# Patient Record
Sex: FEMALE | Race: WHITE | NOT HISPANIC OR LATINO
[De-identification: names, ages, dates, MRNs, and addresses within clinical notes are randomized per-mention and may not be internally consistent; named-entity substitution may affect disease eponyms.]

---

## 2018-12-20 PROBLEM — Z00.00 ENCOUNTER FOR PREVENTIVE HEALTH EXAMINATION: Status: ACTIVE | Noted: 2018-12-20

## 2018-12-21 ENCOUNTER — RECORD ABSTRACTING (OUTPATIENT)
Age: 83
End: 2018-12-21

## 2018-12-21 DIAGNOSIS — I25.10 ATHEROSCLEROTIC HEART DISEASE OF NATIVE CORONARY ARTERY W/OUT ANGINA PECTORIS: ICD-10-CM

## 2018-12-21 DIAGNOSIS — R31.29 OTHER MICROSCOPIC HEMATURIA: ICD-10-CM

## 2018-12-21 DIAGNOSIS — R07.9 CHEST PAIN, UNSPECIFIED: ICD-10-CM

## 2018-12-21 DIAGNOSIS — E78.5 HYPERLIPIDEMIA, UNSPECIFIED: ICD-10-CM

## 2018-12-21 DIAGNOSIS — Z84.1 FAMILY HISTORY OF DISORDERS OF KIDNEY AND URETER: ICD-10-CM

## 2018-12-21 DIAGNOSIS — H81.10 BENIGN PAROXYSMAL VERTIGO, UNSPECIFIED EAR: ICD-10-CM

## 2018-12-21 DIAGNOSIS — R63.4 ABNORMAL WEIGHT LOSS: ICD-10-CM

## 2018-12-21 DIAGNOSIS — Z82.5 FAMILY HISTORY OF ASTHMA AND OTHER CHRONIC LOWER RESPIRATORY DISEASES: ICD-10-CM

## 2019-03-27 ENCOUNTER — MEDICATION RENEWAL (OUTPATIENT)
Age: 84
End: 2019-03-27

## 2019-12-27 ENCOUNTER — APPOINTMENT (OUTPATIENT)
Dept: INTERNAL MEDICINE | Facility: CLINIC | Age: 84
End: 2019-12-27

## 2019-12-27 ENCOUNTER — RX RENEWAL (OUTPATIENT)
Age: 84
End: 2019-12-27

## 2020-03-11 RX ORDER — LOSARTAN POTASSIUM AND HYDROCHLOROTHIAZIDE 12.5; 1 MG/1; MG/1
100-12.5 TABLET ORAL DAILY
Qty: 90 | Refills: 2 | Status: COMPLETED | COMMUNITY
End: 2020-03-11

## 2022-04-27 ENCOUNTER — NON-APPOINTMENT (OUTPATIENT)
Age: 87
End: 2022-04-27

## 2022-04-27 ENCOUNTER — APPOINTMENT (OUTPATIENT)
Dept: INTERNAL MEDICINE | Facility: CLINIC | Age: 87
End: 2022-04-27
Payer: MEDICARE

## 2022-04-27 VITALS
HEIGHT: 62 IN | HEART RATE: 112 BPM | TEMPERATURE: 98 F | SYSTOLIC BLOOD PRESSURE: 168 MMHG | OXYGEN SATURATION: 98 % | WEIGHT: 94 LBS | DIASTOLIC BLOOD PRESSURE: 109 MMHG | BODY MASS INDEX: 17.3 KG/M2

## 2022-04-27 VITALS — DIASTOLIC BLOOD PRESSURE: 98 MMHG | SYSTOLIC BLOOD PRESSURE: 144 MMHG

## 2022-04-27 VITALS — SYSTOLIC BLOOD PRESSURE: 144 MMHG | DIASTOLIC BLOOD PRESSURE: 98 MMHG

## 2022-04-27 DIAGNOSIS — I50.9 HEART FAILURE, UNSPECIFIED: ICD-10-CM

## 2022-04-27 DIAGNOSIS — M1A.9XX0 CHRONIC GOUT, UNSPECIFIED, W/OUT TOPHUS (TOPHI): ICD-10-CM

## 2022-04-27 DIAGNOSIS — I42.9 CARDIOMYOPATHY, UNSPECIFIED: ICD-10-CM

## 2022-04-27 DIAGNOSIS — F41.1 GENERALIZED ANXIETY DISORDER: ICD-10-CM

## 2022-04-27 DIAGNOSIS — E11.9 TYPE 2 DIABETES MELLITUS W/OUT COMPLICATIONS: ICD-10-CM

## 2022-04-27 DIAGNOSIS — I10 ESSENTIAL (PRIMARY) HYPERTENSION: ICD-10-CM

## 2022-04-27 DIAGNOSIS — M05.20 RHEUMATOID VASCULITIS WITH RHEUMATOID ARTHRITIS OF UNSPECIFIED SITE: ICD-10-CM

## 2022-04-27 DIAGNOSIS — F41.9 ANXIETY DISORDER, UNSPECIFIED: ICD-10-CM

## 2022-04-27 DIAGNOSIS — R42 DIZZINESS AND GIDDINESS: ICD-10-CM

## 2022-04-27 PROCEDURE — 36415 COLL VENOUS BLD VENIPUNCTURE: CPT

## 2022-04-27 PROCEDURE — 93000 ELECTROCARDIOGRAM COMPLETE: CPT

## 2022-04-27 PROCEDURE — 99205 OFFICE O/P NEW HI 60 MIN: CPT | Mod: 25

## 2022-04-27 RX ORDER — HYDROCHLOROTHIAZIDE 12.5 MG/1
12.5 TABLET ORAL DAILY
Qty: 90 | Refills: 3 | Status: COMPLETED | COMMUNITY
Start: 2019-12-27 | End: 2022-04-27

## 2022-04-27 RX ORDER — LOSARTAN POTASSIUM 100 MG/1
100 TABLET, FILM COATED ORAL DAILY
Qty: 90 | Refills: 3 | Status: ACTIVE | COMMUNITY
Start: 2020-03-11

## 2022-04-27 RX ORDER — MECLIZINE HYDROCHLORIDE 12.5 MG/1
12.5 TABLET ORAL TWICE DAILY
Qty: 180 | Refills: 3 | Status: ACTIVE | COMMUNITY
Start: 2022-04-27 | End: 1900-01-01

## 2022-04-27 NOTE — PHYSICAL EXAM
[No Acute Distress] : no acute distress [Normal Voice/Communication] : normal voice/communication [Normal Sclera/Conjunctiva] : normal sclera/conjunctiva [Normal Outer Ear/Nose] : the outer ears and nose were normal in appearance [No JVD] : no jugular venous distention [No Lymphadenopathy] : no lymphadenopathy [Supple] : supple [No Respiratory Distress] : no respiratory distress  [No Accessory Muscle Use] : no accessory muscle use [Clear to Auscultation] : lungs were clear to auscultation bilaterally [Normal Rate] : normal rate  [Premature Beats] : regular with premature beats [Normal S1] : normal S1 [Normal S2] : normal S2 [I] : a grade 1 [No Carotid Bruits] : no carotid bruits [No Abdominal Bruit] : a ~M bruit was not heard ~T in the abdomen [No Varicosities] : no varicosities [Pedal Pulses Present] : the pedal pulses are present [No Palpable Aorta] : no palpable aorta [Normal Appearance] : normal in appearance [No Masses] : no palpable masses [Soft] : abdomen soft [Non Tender] : non-tender [Non-distended] : non-distended [No HSM] : no HSM [Normal Axillary Nodes] : no axillary lymphadenopathy [Normal Posterior Cervical Nodes] : no posterior cervical lymphadenopathy [Normal Anterior Cervical Nodes] : no anterior cervical lymphadenopathy [No CVA Tenderness] : no CVA  tenderness [Kyphosis] : kyphosis [Scoliosis] : scoliosis [No Joint Swelling] : no joint swelling [No Rash] : no rash [Coordination Grossly Intact] : coordination grossly intact [Normal Affect] : the affect was normal [Normal Insight/Judgement] : insight and judgment were intact

## 2022-04-27 NOTE — PLAN
[FreeTextEntry1] : Check bloods\par Visiting nurse service forms filled out\par Further workup pending bloods\par Likely would benefit from having part-time aide at home as well as physical therapy

## 2022-04-27 NOTE — ASSESSMENT
[FreeTextEntry1] : Multiple ongoing issues with worst condition due to her end-stage joint deformities from gout and rheumatoid arthritis with severe effect on her hands back and gait\par She has been homebound for over 2 years\par Visiting nurse referral was made to assess for home health aide and physical therapy at home\par She has had multiple falls but on exam today has no concerning injury\par Her EKG shows a left bundle branch block and first degree AV block and is unchanged from prior EKGs\par She has known cardiomyopathy and a propensity to CHF\par She declines aggressive workup due to her age

## 2022-04-27 NOTE — HISTORY OF PRESENT ILLNESS
[FreeTextEntry1] : here after 4 years  [de-identified] : end stage gout\par CHF/cardiomyopathy\par hyperlipidemia on statin\par anxiety and paranoia\par end stage gout\par has not seen a doctor in a couple of years\par Has had a doctor who makes house calls a Dr. Trae Mendoza ordering her meds and apparently visiting her infrequently\par Old chart reviewed\par Patient's current medications reviewed and she is no longer taking her simvastatin\par Patient was seen with son Guerrero and all questions were answered\par

## 2022-04-28 LAB
ALBUMIN SERPL ELPH-MCNC: 4.2 G/DL
ALP BLD-CCNC: 50 U/L
ALT SERPL-CCNC: 10 U/L
ANION GAP SERPL CALC-SCNC: 13 MMOL/L
AST SERPL-CCNC: 19 U/L
BASOPHILS # BLD AUTO: 0.05 K/UL
BASOPHILS NFR BLD AUTO: 1.1 %
BILIRUB SERPL-MCNC: 0.5 MG/DL
BUN SERPL-MCNC: 14 MG/DL
CALCIUM SERPL-MCNC: 9.5 MG/DL
CHLORIDE SERPL-SCNC: 98 MMOL/L
CHOLEST SERPL-MCNC: 187 MG/DL
CK SERPL-CCNC: 93 U/L
CO2 SERPL-SCNC: 23 MMOL/L
CREAT SERPL-MCNC: 0.56 MG/DL
EGFR: 86 ML/MIN/1.73M2
EOSINOPHIL # BLD AUTO: 0.08 K/UL
EOSINOPHIL NFR BLD AUTO: 1.8 %
ESTIMATED AVERAGE GLUCOSE: 111 MG/DL
GLUCOSE SERPL-MCNC: 115 MG/DL
HBA1C MFR BLD HPLC: 5.5 %
HCT VFR BLD CALC: 41 %
HDLC SERPL-MCNC: 57 MG/DL
HGB BLD-MCNC: 13.3 G/DL
IMM GRANULOCYTES NFR BLD AUTO: 0.2 %
LDLC SERPL CALC-MCNC: 118 MG/DL
LYMPHOCYTES # BLD AUTO: 1.17 K/UL
LYMPHOCYTES NFR BLD AUTO: 26.3 %
MAN DIFF?: NORMAL
MCHC RBC-ENTMCNC: 29 PG
MCHC RBC-ENTMCNC: 32.4 GM/DL
MCV RBC AUTO: 89.3 FL
MONOCYTES # BLD AUTO: 0.48 K/UL
MONOCYTES NFR BLD AUTO: 10.8 %
NEUTROPHILS # BLD AUTO: 2.66 K/UL
NEUTROPHILS NFR BLD AUTO: 59.8 %
NONHDLC SERPL-MCNC: 130 MG/DL
PLATELET # BLD AUTO: 233 K/UL
POTASSIUM SERPL-SCNC: 4 MMOL/L
PROT SERPL-MCNC: 7.6 G/DL
RBC # BLD: 4.59 M/UL
RBC # FLD: 13.9 %
SODIUM SERPL-SCNC: 134 MMOL/L
T4 FREE SERPL-MCNC: 1.8 NG/DL
TRIGL SERPL-MCNC: 62 MG/DL
TSH SERPL-ACNC: 2.86 UIU/ML
URATE SERPL-MCNC: 2.3 MG/DL
VIT B12 SERPL-MCNC: 344 PG/ML
WBC # FLD AUTO: 4.45 K/UL

## 2022-10-03 RX ORDER — ALPRAZOLAM 0.5 MG/1
0.5 TABLET ORAL 4 TIMES DAILY
Refills: 0 | Status: COMPLETED | COMMUNITY
End: 2022-10-03

## 2023-05-11 ENCOUNTER — NON-APPOINTMENT (OUTPATIENT)
Age: 88
End: 2023-05-11

## 2023-05-21 ENCOUNTER — INPATIENT (INPATIENT)
Facility: HOSPITAL | Age: 88
LOS: 2 days | Discharge: HOME HEALTH | End: 2023-05-24
Attending: INTERNAL MEDICINE | Admitting: INTERNAL MEDICINE
Payer: MEDICARE

## 2023-05-21 VITALS
SYSTOLIC BLOOD PRESSURE: 158 MMHG | HEART RATE: 110 BPM | TEMPERATURE: 98 F | DIASTOLIC BLOOD PRESSURE: 111 MMHG | OXYGEN SATURATION: 100 % | HEIGHT: 60 IN | RESPIRATION RATE: 17 BRPM | WEIGHT: 89.95 LBS

## 2023-05-21 DIAGNOSIS — I10 ESSENTIAL (PRIMARY) HYPERTENSION: ICD-10-CM

## 2023-05-21 DIAGNOSIS — F41.9 ANXIETY DISORDER, UNSPECIFIED: ICD-10-CM

## 2023-05-21 DIAGNOSIS — M06.9 RHEUMATOID ARTHRITIS, UNSPECIFIED: ICD-10-CM

## 2023-05-21 DIAGNOSIS — R07.9 CHEST PAIN, UNSPECIFIED: ICD-10-CM

## 2023-05-21 DIAGNOSIS — Z86.79 PERSONAL HISTORY OF OTHER DISEASES OF THE CIRCULATORY SYSTEM: ICD-10-CM

## 2023-05-21 DIAGNOSIS — Z65.9 PROBLEM RELATED TO UNSPECIFIED PSYCHOSOCIAL CIRCUMSTANCES: ICD-10-CM

## 2023-05-21 LAB
ALBUMIN SERPL ELPH-MCNC: 3.3 G/DL — SIGNIFICANT CHANGE UP (ref 3.3–5)
ALP SERPL-CCNC: 54 U/L — SIGNIFICANT CHANGE UP (ref 40–120)
ALT FLD-CCNC: 19 U/L — SIGNIFICANT CHANGE UP (ref 12–78)
ANION GAP SERPL CALC-SCNC: 5 MMOL/L — SIGNIFICANT CHANGE UP (ref 5–17)
APPEARANCE UR: CLEAR — SIGNIFICANT CHANGE UP
AST SERPL-CCNC: 18 U/L — SIGNIFICANT CHANGE UP (ref 15–37)
BACTERIA # UR AUTO: ABNORMAL
BASOPHILS # BLD AUTO: 0.04 K/UL — SIGNIFICANT CHANGE UP (ref 0–0.2)
BASOPHILS NFR BLD AUTO: 0.3 % — SIGNIFICANT CHANGE UP (ref 0–2)
BILIRUB SERPL-MCNC: 0.7 MG/DL — SIGNIFICANT CHANGE UP (ref 0.2–1.2)
BILIRUB UR-MCNC: NEGATIVE — SIGNIFICANT CHANGE UP
BUN SERPL-MCNC: 18 MG/DL — SIGNIFICANT CHANGE UP (ref 7–23)
CALCIUM SERPL-MCNC: 8.9 MG/DL — SIGNIFICANT CHANGE UP (ref 8.5–10.1)
CHLORIDE SERPL-SCNC: 99 MMOL/L — SIGNIFICANT CHANGE UP (ref 96–108)
CO2 SERPL-SCNC: 28 MMOL/L — SIGNIFICANT CHANGE UP (ref 22–31)
COLOR SPEC: YELLOW — SIGNIFICANT CHANGE UP
CREAT SERPL-MCNC: 0.52 MG/DL — SIGNIFICANT CHANGE UP (ref 0.5–1.3)
DIFF PNL FLD: NEGATIVE — SIGNIFICANT CHANGE UP
EGFR: 87 ML/MIN/1.73M2 — SIGNIFICANT CHANGE UP
EOSINOPHIL # BLD AUTO: 0.08 K/UL — SIGNIFICANT CHANGE UP (ref 0–0.5)
EOSINOPHIL NFR BLD AUTO: 0.7 % — SIGNIFICANT CHANGE UP (ref 0–6)
EPI CELLS # UR: SIGNIFICANT CHANGE UP
GLUCOSE SERPL-MCNC: 120 MG/DL — HIGH (ref 70–99)
GLUCOSE UR QL: NEGATIVE MG/DL — SIGNIFICANT CHANGE UP
HCT VFR BLD CALC: 42.7 % — SIGNIFICANT CHANGE UP (ref 34.5–45)
HGB BLD-MCNC: 14 G/DL — SIGNIFICANT CHANGE UP (ref 11.5–15.5)
IMM GRANULOCYTES NFR BLD AUTO: 0.6 % — SIGNIFICANT CHANGE UP (ref 0–0.9)
KETONES UR-MCNC: NEGATIVE — SIGNIFICANT CHANGE UP
LEUKOCYTE ESTERASE UR-ACNC: NEGATIVE — SIGNIFICANT CHANGE UP
LIDOCAIN IGE QN: 220 U/L — SIGNIFICANT CHANGE UP (ref 73–393)
LYMPHOCYTES # BLD AUTO: 0.65 K/UL — LOW (ref 1–3.3)
LYMPHOCYTES # BLD AUTO: 5.5 % — LOW (ref 13–44)
MCHC RBC-ENTMCNC: 29.9 PG — SIGNIFICANT CHANGE UP (ref 27–34)
MCHC RBC-ENTMCNC: 32.8 G/DL — SIGNIFICANT CHANGE UP (ref 32–36)
MCV RBC AUTO: 91.2 FL — SIGNIFICANT CHANGE UP (ref 80–100)
MONOCYTES # BLD AUTO: 0.91 K/UL — HIGH (ref 0–0.9)
MONOCYTES NFR BLD AUTO: 7.6 % — SIGNIFICANT CHANGE UP (ref 2–14)
NEUTROPHILS # BLD AUTO: 10.16 K/UL — HIGH (ref 1.8–7.4)
NEUTROPHILS NFR BLD AUTO: 85.3 % — HIGH (ref 43–77)
NITRITE UR-MCNC: NEGATIVE — SIGNIFICANT CHANGE UP
NRBC # BLD: 0 /100 WBCS — SIGNIFICANT CHANGE UP (ref 0–0)
NT-PROBNP SERPL-SCNC: 2584 PG/ML — HIGH (ref 0–450)
PH UR: 8 — SIGNIFICANT CHANGE UP (ref 5–8)
PLATELET # BLD AUTO: 211 K/UL — SIGNIFICANT CHANGE UP (ref 150–400)
POTASSIUM SERPL-MCNC: 4 MMOL/L — SIGNIFICANT CHANGE UP (ref 3.5–5.3)
POTASSIUM SERPL-SCNC: 4 MMOL/L — SIGNIFICANT CHANGE UP (ref 3.5–5.3)
PROT SERPL-MCNC: 7.6 GM/DL — SIGNIFICANT CHANGE UP (ref 6–8.3)
PROT UR-MCNC: NEGATIVE MG/DL — SIGNIFICANT CHANGE UP
RBC # BLD: 4.68 M/UL — SIGNIFICANT CHANGE UP (ref 3.8–5.2)
RBC # FLD: 13.4 % — SIGNIFICANT CHANGE UP (ref 10.3–14.5)
RBC CASTS # UR COMP ASSIST: NEGATIVE /HPF — SIGNIFICANT CHANGE UP (ref 0–4)
SODIUM SERPL-SCNC: 132 MMOL/L — LOW (ref 135–145)
SP GR SPEC: 1.01 — SIGNIFICANT CHANGE UP (ref 1.01–1.02)
T3 SERPL-MCNC: 69 NG/DL — LOW (ref 80–200)
T4 AB SER-ACNC: 8.7 UG/DL — SIGNIFICANT CHANGE UP (ref 4.6–12)
TROPONIN I, HIGH SENSITIVITY RESULT: 7 NG/L — SIGNIFICANT CHANGE UP
TSH SERPL-MCNC: 2.06 UIU/ML — SIGNIFICANT CHANGE UP (ref 0.36–3.74)
UROBILINOGEN FLD QL: NEGATIVE MG/DL — SIGNIFICANT CHANGE UP
WBC # BLD: 11.91 K/UL — HIGH (ref 3.8–10.5)
WBC # FLD AUTO: 11.91 K/UL — HIGH (ref 3.8–10.5)
WBC UR QL: NEGATIVE — SIGNIFICANT CHANGE UP

## 2023-05-21 PROCEDURE — 93010 ELECTROCARDIOGRAM REPORT: CPT

## 2023-05-21 PROCEDURE — 99285 EMERGENCY DEPT VISIT HI MDM: CPT

## 2023-05-21 PROCEDURE — 71045 X-RAY EXAM CHEST 1 VIEW: CPT | Mod: 26

## 2023-05-21 RX ORDER — ASPIRIN/CALCIUM CARB/MAGNESIUM 324 MG
325 TABLET ORAL DAILY
Refills: 0 | Status: DISCONTINUED | OUTPATIENT
Start: 2023-05-22 | End: 2023-05-24

## 2023-05-21 RX ORDER — HEPARIN SODIUM 5000 [USP'U]/ML
5000 INJECTION INTRAVENOUS; SUBCUTANEOUS EVERY 12 HOURS
Refills: 0 | Status: DISCONTINUED | OUTPATIENT
Start: 2023-05-21 | End: 2023-05-24

## 2023-05-21 RX ORDER — ATORVASTATIN CALCIUM 80 MG/1
20 TABLET, FILM COATED ORAL AT BEDTIME
Refills: 0 | Status: DISCONTINUED | OUTPATIENT
Start: 2023-05-21 | End: 2023-05-24

## 2023-05-21 RX ORDER — ACETAMINOPHEN 500 MG
650 TABLET ORAL EVERY 6 HOURS
Refills: 0 | Status: DISCONTINUED | OUTPATIENT
Start: 2023-05-21 | End: 2023-05-24

## 2023-05-21 RX ORDER — SERTRALINE 25 MG/1
25 TABLET, FILM COATED ORAL DAILY
Refills: 0 | Status: DISCONTINUED | OUTPATIENT
Start: 2023-05-21 | End: 2023-05-24

## 2023-05-21 RX ORDER — METOPROLOL TARTRATE 50 MG
50 TABLET ORAL DAILY
Refills: 0 | Status: DISCONTINUED | OUTPATIENT
Start: 2023-05-21 | End: 2023-05-24

## 2023-05-21 RX ORDER — CARVEDILOL PHOSPHATE 80 MG/1
12.5 CAPSULE, EXTENDED RELEASE ORAL ONCE
Refills: 0 | Status: COMPLETED | OUTPATIENT
Start: 2023-05-21 | End: 2023-05-21

## 2023-05-21 RX ORDER — ALPRAZOLAM 0.25 MG
0.25 TABLET ORAL
Refills: 0 | Status: DISCONTINUED | OUTPATIENT
Start: 2023-05-21 | End: 2023-05-24

## 2023-05-21 RX ORDER — ONDANSETRON 8 MG/1
4 TABLET, FILM COATED ORAL ONCE
Refills: 0 | Status: COMPLETED | OUTPATIENT
Start: 2023-05-21 | End: 2023-05-21

## 2023-05-21 RX ORDER — LOSARTAN POTASSIUM 100 MG/1
100 TABLET, FILM COATED ORAL DAILY
Refills: 0 | Status: DISCONTINUED | OUTPATIENT
Start: 2023-05-21 | End: 2023-05-24

## 2023-05-21 RX ORDER — ALPRAZOLAM 0.25 MG
0.5 TABLET ORAL ONCE
Refills: 0 | Status: DISCONTINUED | OUTPATIENT
Start: 2023-05-21 | End: 2023-05-21

## 2023-05-21 RX ORDER — ASPIRIN/CALCIUM CARB/MAGNESIUM 324 MG
324 TABLET ORAL ONCE
Refills: 0 | Status: COMPLETED | OUTPATIENT
Start: 2023-05-21 | End: 2023-05-21

## 2023-05-21 RX ORDER — NITROGLYCERIN 6.5 MG
0.4 CAPSULE, EXTENDED RELEASE ORAL
Refills: 0 | Status: DISCONTINUED | OUTPATIENT
Start: 2023-05-21 | End: 2023-05-24

## 2023-05-21 RX ADMIN — ATORVASTATIN CALCIUM 20 MILLIGRAM(S): 80 TABLET, FILM COATED ORAL at 22:11

## 2023-05-21 RX ADMIN — Medication 0.5 MILLIGRAM(S): at 10:09

## 2023-05-21 RX ADMIN — CARVEDILOL PHOSPHATE 12.5 MILLIGRAM(S): 80 CAPSULE, EXTENDED RELEASE ORAL at 10:09

## 2023-05-21 RX ADMIN — Medication 650 MILLIGRAM(S): at 18:30

## 2023-05-21 RX ADMIN — HEPARIN SODIUM 5000 UNIT(S): 5000 INJECTION INTRAVENOUS; SUBCUTANEOUS at 17:08

## 2023-05-21 RX ADMIN — Medication 0.25 MILLIGRAM(S): at 22:11

## 2023-05-21 RX ADMIN — Medication 650 MILLIGRAM(S): at 17:08

## 2023-05-21 NOTE — ED PROVIDER NOTE - PROGRESS NOTE DETAILS
Neville: Missed home meds, feels anxious that she got brought to wrong hospital so requesting home prn xanax.  Labs non actionable, patient feels well and tolerating po.  Will dc. Neville: Patient initially wanted to go home and felt well to do so because she doesn't want to stay but started becoming anxious at the idea since she has difficulty getting around with walker, has no support at home, doesn't have water in the house and nobody to pick it up.  OSH working on getting aide at home but not set up yet.  Unsafe discharge, will need admission for social reasons.

## 2023-05-21 NOTE — PATIENT PROFILE ADULT - FALL HARM RISK - HARM RISK INTERVENTIONS
Communicate Risk of Fall with Harm to all staff/Reinforce activity limits and safety measures with patient and family/Tailored Fall Risk Interventions/Visual Cue: Yellow wristband and red socks/Bed in lowest position, wheels locked, appropriate side rails in place/Call bell, personal items and telephone in reach/Instruct patient to call for assistance before getting out of bed or chair/Non-slip footwear when patient is out of bed/[place] to call system/Physically safe environment - no spills, clutter or unnecessary equipment/Purposeful Proactive Rounding/Room/bathroom lighting operational, light cord in reach Assistance with ambulation/Assistance OOB with selected safe patient handling equipment/Communicate Risk of Fall with Harm to all staff/Discuss with provider need for PT consult/Monitor gait and stability/Provide patient with walking aids - walker, cane, crutches/Reinforce activity limits and safety measures with patient and family/Sit up slowly, dangle for a short time, stand at bedside before walking/Tailored Fall Risk Interventions/Visual Cue: Yellow wristband and red socks/Bed in lowest position, wheels locked, appropriate side rails in place/Call bell, personal items and telephone in reach/Instruct patient to call for assistance before getting out of bed or chair/Non-slip footwear when patient is out of bed/[place] to call system/Physically safe environment - no spills, clutter or unnecessary equipment/Purposeful Proactive Rounding/Room/bathroom lighting operational, light cord in reach

## 2023-05-21 NOTE — ED PROVIDER NOTE - PATIENT PORTAL LINK FT
You can access the FollowMyHealth Patient Portal offered by [facility] by registering at the following website: [email or web address]. By joining [employer]’s FollowMyHealth portal, you will also be able to view your health information using other applications (apps) compatible with our system.

## 2023-05-21 NOTE — ED PROVIDER NOTE - PHYSICAL EXAMINATION
General appearance: Nontoxic appearing, anxious, conversant, afebrile    Eyes: anicteric sclerae, CARA, EOMI   HENT: Atraumatic; oropharynx clear, MMM and no ulcerations, no pharyngeal erythema or exudate   Neck: Trachea midline; Full range of motion, supple   Pulm: CTA bl, normal respiratory effort and no intercostal retractions, normal work of breathing   CV: Tachycardic, regular, No murmurs, rubs, or gallops   Abdomen: Soft, non-tender, non-distended; no guarding or rebound   Extremities: No peripheral edema, FROM x4, multiple rheumatoid nodules b/l UE   Skin: Dry, normal temperature, turgor and texture; no rash   Psych: Appropriate affect, cooperative

## 2023-05-21 NOTE — ED ADULT NURSE NOTE - PRO INTERPRETER NEED 2
Chief Complaint   Patient presents with    Asthma    COPD    Panic Attack    Sad         HPI:       is a 62 y.o. female who until July 2016 worked for [employer][1] [employer] in [facility]. Developed LBP with RLE sciatica. PT and NATALIIA. Has not used a TENS unit. Well until presenting suddenly with hypoxia and SOB to [facility] ER early March 2017 with acute PE diagnosed by Dr Doug Laguna. Place on Lovenox as bridge to Warfarin; Suffered acute hemorrhage to the RLE requiring rehospitalization at ED [facility]; Discharged home 3/28 on Apixaban 5 mg BID. New Issues:  Remains unable to work. Here today completing paperwork for patient assistance for Apixaban    Allergies   Allergen Reactions    Pcn [Penicillins] Rash    Gabapentin Other (comments)     Hallucinations/ sleep walking    Griseofulvin Other (comments)     Aaron-dirk syndrome    Pcn [Penicillins] Rash and Swelling    Tramadol Nausea Only and Drowsiness     Interaction with Effexor       Current Outpatient Prescriptions   Medication Sig    ALPRAZolam (XANAX) 0.5 mg tablet Take 1 Tab by mouth nightly as needed for Anxiety. Max Daily Amount: 0.5 mg.    apixaban (ELIQUIS) 5 mg tablet Take 1 Tab by mouth two (2) times a day.  diphenhydrAMINE (BENADRYL) 25 mg capsule Take 25 mg by mouth every six (6) hours as needed.  calcium carbonate (TUMS) 200 mg calcium (500 mg) chew Take 3 Tabs by mouth daily.  venlafaxine-SR (EFFEXOR XR) 150 mg capsule Take 150 mg by mouth daily.  albuterol (PROVENTIL VENTOLIN) 2.5 mg /3 mL (0.083 %) nebulizer solution by Nebulization route once.  vitamin A (AQUASOL A) 10,000 unit capsule Take 10,000 Units by mouth daily.  cyanocobalamin (VITAMIN B-12) 1,000 mcg tablet Take 1,000 mcg by mouth daily.  ferrous sulfate (SLOW FE) 142 mg (45 mg iron) ER tablet Take 45 mg by mouth Daily (before breakfast).  ergocalciferol (ERGOCALCIFEROL) 50,000 unit capsule Take 1 Cap by mouth every seven (7) days.     esomeprazole (NEXIUM) 40 mg capsule Take 1 Cap by mouth daily.  mometasone-formoterol (DULERA) 200-5 mcg/actuation HFA inhaler Take 2 Puffs by inhalation two (2) times a day.  albuterol (PROAIR HFA) 90 mcg/actuation inhaler Take 2 Puffs by inhalation every four (4) hours as needed for Wheezing.  nortriptyline (PAMELOR) 25 mg capsule take 1 to 2 tablets by mouth at bedtime if needed    zolpidem (AMBIEN) 5 mg tablet Take 1 Tab by mouth nightly as needed for Sleep. Max Daily Amount: 5 mg.  sucralfate (CARAFATE) 100 mg/mL suspension Take 5 mL by mouth four (4) times daily.  cyclobenzaprine (FLEXERIL) 10 mg tablet Take 1 Tab by mouth three (3) times daily as needed for Muscle Spasm(s).  albuterol-ipratropium (DUO-NEB) 2.5 mg-0.5 mg/3 ml nebulizer solution 3 mL by Nebulization route every four (4) hours. Indications: CHRONIC OBSTRUCTIVE PULMONARY DISEASE WITH BRONCHOSPASMS    inhalational spacing device 1 Each by Does Not Apply route as needed. No current facility-administered medications for this visit. Past Medical History:   Diagnosis Date    Anemia 3/15/2013    Arrhythmia     paroxsimal afib    Asthma 3/15/2013    Asthma     Back disorder     disc problem    Chronic obstructive pulmonary disease (HCC)     Morbid obesity ([address] 3/15/2013         ROS:  Denies fever, chills, cough, chest pain, SOB,  nausea, vomiting, or diarrhea. Denies wt loss, wt gain, hemoptysis, hematochezia or melena.     Physical Examination:    /52 Comment: large cuff  Pulse 87  Temp 97.1 °F (36.2 °C) (Oral)   Resp 16  Ht 5' 6\" (1.676 m)  Wt 251 lb (113.9 kg)  SpO2 94%  BMI 40.51 kg/m2    General: Alert and Ox3, Fluent speech  HEENT:  NC/AT, EOMI, OP: clear  Neck:  Supple, no adenopathy, JVD, mass or bruit  Chest:  Clear to Ausculation, without wheezes, rales, rubs or ronchi  Cardiac: RRR  Abdomen:  +BS, soft, nontender without palpable HSM  Extremities:  No cyanosis, clubbing or edema  Neurologic:  Ambulatory without assist, CN 2-12 grossly intact. Moves all extremities. Skin: no rash  Lymphadenopathy: no cervical or supraclavicular nodes      ASSESSMENT AND PLAN:     1. PE:  Letter, paperwork and Rx completed and faxed for patient. 2.  RTC in a month    Orders Placed This Encounter    ALPRAZolam (XANAX) 0.5 mg tablet     Sig: Take 1 Tab by mouth nightly as needed for Anxiety. Max Daily Amount: 0.5 mg.     Dispense:  30 Tab     Refill:  2    apixaban (ELIQUIS) 5 mg tablet     Sig: Take 1 Tab by mouth two (2) times a day.      Dispense:  180 Tab     Refill:  4       Nurys Conti MD, Joaquin English

## 2023-05-21 NOTE — ED PROVIDER NOTE - NSFOLLOWUPINSTRUCTIONS_ED_ALL_ED_FT
Follow up with your cardiologist  Rest, drink plenty of fluids  Advance activity as tolerated  Continue all previously prescribed medications as directed  Follow up with your PMD - bring copies of your results  Return to the ER for chest pain, difficulty breathing, worsening symptoms, or other new or concerning symptoms

## 2023-05-21 NOTE — H&P ADULT - HISTORY OF PRESENT ILLNESS
: 89yo female with pmh anxiety, htn, chf, RA, presenting with epigastric discomfort, vomiting, loose stools.  Has had this frequently before and typically goes to [facility] for this but ems brought her here.  Denies pain to me, feels burning discomfort.  No sob.  Vomiting pta, minimal nausea now.  No fevers.  No pshx.  Missed 7am carvedilol.  Now  [facility]  has  CP  which  she  has  had multiple  times  in  past  states has congestive heart failure and  was told  needed  stents  but  refuses  because feels  they  rust  and  knows  several people  in her  apartment  complex  who  have    aftr  getting  hear  stents has  not  seen  her  cardilogist Dr Levi  for  several years  because  she  has no  way of  getting  to him states she did not take her medicatins this am  is anxious  about  being  admitted  and  anxious  about  going  home  because  [facility]  needs  help has no  water    [facility]  has  a son in [place]  but  does not  have his  phone  number  with her

## 2023-05-21 NOTE — ED ADULT TRIAGE NOTE - CHIEF COMPLAINT QUOTE
epigastric chest pain , nausea and vomiting. epigastric chest pain , nausea and vomiting stared one hour ago. epigastric chest pain , nausea and vomiting started one hour ago.

## 2023-05-21 NOTE — ED ADULT NURSE REASSESSMENT NOTE - NS ED NURSE REASSESS COMMENT FT1
Pt is refusing her maalox, zofran and aspirin, despite medication education. Pt states she know it will not help her stomach. Pt is resting in bed, and she called her son Edward on the phone.

## 2023-05-21 NOTE — ED PROVIDER NOTE - OBJECTIVE STATEMENT
89yo female with pmh anxiety, htn, chf, RA, presenting with epigastric discomfort, vomiting, loose stools.  Has had this frequently before and typically goes to [facility] for this but ems brought her here.  Denies pain to me, feels burning discomfort.  No sob.  Vomiting pta, minimal nausea now.  No fevers.  No pshx.  Missed 7am carvedilol.

## 2023-05-21 NOTE — H&P ADULT - PROBLEM SELECTOR PLAN 1
metoprolol  asa  add  lipitor  nitrostat prn  unclear  if  LBB  old or  new   monitor  on telemetry  trend  troponins  cardiology  evaluation

## 2023-05-21 NOTE — ED PROVIDER NOTE - CARE PLAN
1 Principal Discharge DX:	Chest pain   Principal Discharge DX:	Chest pain  Secondary Diagnosis:	Adult failure to thrive

## 2023-05-21 NOTE — ED ADULT NURSE NOTE - NSFALLHARMRISKINTERV_ED_ALL_ED

## 2023-05-21 NOTE — H&P ADULT - NSHPPHYSICALEXAM_GEN_ALL_CORE
PHYSICAL EXAM:    GENERAL: NAD, well-groomed, well-developed  HEAD:  Atraumatic, Normocephalic  EYES: EOMI, PERRLA, conjunctiva and sclera clear    NECK: Supple, No JVD, Normal thyroid  NERVOUS SYSTEM:  Alert & Oriented X 3 no focal deficits  CHEST/LUNG: Clear  bilaterally; No rales, rhonchi, wheezing, or rubs  HEART: Regular rate and rhythm; No murmurs, rubs, or gallops  ABDOMEN: Soft, Nontender, Nondistended; no  masses Bowel sounds present  EXTREMITIES  bilateral severe MCP  deformity with  flexure  contractures no  edema    LYMPH: No lymphadenopathy noted   RECTAL: deferred    SKIN: No rashes or lesions PHYSICAL EXAM:    GENERAL: NAD, well-groomed, well-developed  HEAD:  Atraumatic, Normocephalic  EYES: EOMI, PERRLA, conjunctiva and sclera clear    NECK: Supple, No JVD, Normal thyroid  NERVOUS SYSTEM:  Alert & Oriented X 3 no focal deficits  CHEST/LUNG: Clear  bilaterally; No rales, rhonchi, wheezing, or rubs  HEART: Regular rate and rhythm; No murmurs, rubs, or gallops  ABDOMEN: Soft, Nontender, Nondistended; no  masses Bowel sounds present  EXTR  bilateral  tophaceous  growths  L  >rt  no  tenderness from  EXTREMITIES  bilateral severe MCP  deformity with  flexure  contractures no  edema    LYMPH: No lymphadenopathy noted   RECTAL: deferred    SKIN: No rashes or lesions PHYSICAL EXAM:    GENERAL: NAD, well-groomed, well-developed  HEAD:  Atraumatic, Normocephalic  EYES: EOMI, PERRLA, conjunctiva and sclera clear    NECK: Supple, No JVD, Normal thyroid  NERVOUS SYSTEM:  Alert & Oriented X 3 no focal deficits  CHEST/LUNG: Clear  bilaterally; No rales, rhonchi, wheezing, or rubs  HEART: Regular rate and rhythm; No murmurs, rubs, or gallops  ABDOMEN: Soft, Nontender, Nondistended; no  masses Bowel sounds present  EXTR  bilateral  tophaceous  growths  L  >rt  no  tenderness frorm  bilateral severe MCP  deformity with  flexure  contractures no  edema    LYMPH: No lymphadenopathy noted   RECTAL: deferred    SKIN: No rashes or lesions

## 2023-05-21 NOTE — ED ADULT NURSE NOTE - OBJECTIVE STATEMENT
Pt BIBA with c/o of "burning in the chest" and rapid heart beat and dizziness. pt verbalized that she lives at a assisted living and states that she has periods of fast heart beat. Pt is a poor historian and unable to give clear chief complaints or past medical history.

## 2023-05-21 NOTE — PATIENT PROFILE ADULT - HOME ACCESSIBILITY CONCERNS
Physical Therapy    [facility],  Physical Therapy  Cancellation/No-show Note  Patient Name:  Saad Butler  :  1960   Date:  2022  Cancelled visits to date: 0  No-shows to date: ,     For today's appointment patient:  []  Cancelled  []  Rescheduled appointment  [x]  No-show     Reason given by patient:  []  Patient ill  []  Conflicting appointment  []  No transportation    []  Conflict with work  []  No reason given  []  Other:     Comments:      Phone call information:   []  Phone call made today to patient at _ time at number provided:      []  Patient answered, conversation as follows:    []  Patient did not answer, message left as follows:  [x]  Phone call not made today due to language barrier ([place]alatha)  []  Phone call not needed - pt contacted us to cancel and provided reason for cancellation.      Electronically signed by:  Margarita Jennings, [id number]
other

## 2023-05-21 NOTE — PATIENT PROFILE ADULT - FLU SEASON?
Care Management Interventions  PCP Verified by CM: Yes  Transition of Care Consult (CM Consult): Home Health, Discharge Planning  [address] [id number]: No  Reason Outside [place]:  (Used the same agency her spouse is current with. )  Current Support Network: Lives with Spouse, Other (adult son is in the home )  Confirm Follow Up Transport: Family  Plan discussed with Pt/Family/Caregiver: Yes  Freedom of Choice Offered: Yes  Discharge Location  Discharge Placement: Home with home health     DC to home with son and spouse and [facility] and PT. Maki Huang No

## 2023-05-21 NOTE — ED PROVIDER NOTE - CLINICAL SUMMARY MEDICAL DECISION MAKING FREE TEXT BOX
Presenting with cp, palpitations.  Endorsing many similar episodes as before, normally goes to [facility] for care and very anxious about being in new hospital but states presentation is similar to prior visits there.  No pain now.  No sob, diaphoresis.  Points to epigastric region, vomiting earlier with loose stools.  Possible viral, will eval for acs, not c/w pe.  Patient missed home carvedilol this am, this with anxiousness likely why tachycardic.  Also requesting home xanax.  Exam reassuring, nonfocal.  Will get labs, tele, ecg, xr, meds, reassess.

## 2023-05-21 NOTE — H&P ADULT - ASSESSMENT
IMPROVE VTE Individual Risk Assessment    RISK                                                                Points    [  ] Previous VTE                                                  3    [  ] Thrombophilia                                               2    [  ] Lower limb paralysis                                      2        (unable to hold up >15 seconds)      [  ] Current Cancer                                              2         (within 6 months)    [x  ] Immobilization > 24 hrs                                1    [  ] ICU/CCU stay > 24 hours                              1    [ x ] Age > 60                                                      1    IMPROVE VTE Score _____2____    IMPROVE Score 0-1: Low Risk, No VTE prophylaxis required for most patients, encourage ambulation.   IMPROVE Score 2-3: At risk, pharmacologic VTE prophylaxis is indicated for most patients (in the absence of a contraindication)  IMPROVE Score > or = 4: High Risk, pharmacologic VTE prophylaxis is indicated for most patients (in the absence of a contraindication)

## 2023-05-21 NOTE — PATIENT PROFILE ADULT - FUNCTIONAL ASSESSMENT - BASIC MOBILITY 6.
2-calculated by average/Not able to assess (calculate score using [facility] averaging method)

## 2023-05-21 NOTE — H&P ADULT - NSICDXPASTMEDICALHX_GEN_ALL_CORE_FT
PAST MEDICAL HISTORY:  CAD (coronary artery disease)     History of chronic CHF     HTN (hypertension)     Rheumatoid arthritis

## 2023-05-22 LAB
CULTURE RESULTS: SIGNIFICANT CHANGE UP
SPECIMEN SOURCE: SIGNIFICANT CHANGE UP
TROPONIN I, HIGH SENSITIVITY RESULT: 22.9 NG/L — SIGNIFICANT CHANGE UP
TROPONIN I, HIGH SENSITIVITY RESULT: 29 NG/L — SIGNIFICANT CHANGE UP
URATE SERPL-MCNC: 2.3 MG/DL — LOW (ref 2.5–7)

## 2023-05-22 PROCEDURE — 99223 1ST HOSP IP/OBS HIGH 75: CPT

## 2023-05-22 RX ADMIN — Medication 0.25 MILLIGRAM(S): at 14:19

## 2023-05-22 RX ADMIN — HEPARIN SODIUM 5000 UNIT(S): 5000 INJECTION INTRAVENOUS; SUBCUTANEOUS at 05:43

## 2023-05-22 RX ADMIN — Medication 0.25 MILLIGRAM(S): at 21:35

## 2023-05-22 RX ADMIN — ATORVASTATIN CALCIUM 20 MILLIGRAM(S): 80 TABLET, FILM COATED ORAL at 21:34

## 2023-05-22 RX ADMIN — HEPARIN SODIUM 5000 UNIT(S): 5000 INJECTION INTRAVENOUS; SUBCUTANEOUS at 17:46

## 2023-05-22 NOTE — CONSULT NOTE ADULT - ASSESSMENT
The chart has been reviewed but the patient has not yet been examined.  Full note to follow. 89-year-old female admitted with epigastric discomfort vomiting and change in bowel movements.  Atypical chest discomfort unlikely to be acute coronary insufficiency.  Left bundle branch block previously described and although there may be underlying coronary disease, patient steadfastly refuses any interventions.  Currently chest pain-free.  Further evaluation can be relegated to her outpatient cardiologist, which is what the patient wishes.  Patient has some barriers to her continuing to live at home but this requires social work intervention.  We will follow only as needed.

## 2023-05-22 NOTE — PROGRESS NOTE ADULT - SUBJECTIVE AND OBJECTIVE BOX
INTERVAL HPI/OVERNIGHT EVENTS:        REVIEW OF SYSTEMS:  CONSTITUTIONAL:  c/o  not  being  taken  care  of  states  will will go  home  if  this  continues    NECK: No pain or stiffnes  RESPIRATORY: No SOB   CARDIOVASCULAR: No chest pain, palpitations, dizziness,   GASTROINTESTINAL: No abdominal pain. No nausea, vomiting,   NEUROLOGICAL: No headaches, no  blurry  vision no  dizziness  SKIN: No itching,   MUSCULOSKELETAL: No pain    MEDICATION:  acetaminophen     Tablet .. 650 milliGRAM(s) Oral every 6 hours PRN  ALPRAZolam 0.25 milliGRAM(s) Oral two times a day PRN  aspirin 325 milliGRAM(s) Oral daily  atorvastatin 20 milliGRAM(s) Oral at bedtime  heparin   Injectable 5000 Unit(s) SubCutaneous every 12 hours  hydrochlorothiazide 12.5 milliGRAM(s) Oral daily  losartan 100 milliGRAM(s) Oral daily  metoprolol succinate ER 50 milliGRAM(s) Oral daily  nitroglycerin     SubLingual 0.4 milliGRAM(s) SubLingual every 5 minutes PRN  sertraline 25 milliGRAM(s) Oral daily    Vital Signs Last 24 Hrs  T(C): 36.4 (22 May 2023 05:25), Max: 36.9 (21 May 2023 10:25)  T(F): 97.6 (22 May 2023 05:25), Max: 98.4 (21 May 2023 10:25)  HR: 66 (22 May 2023 05:25) (64 - 90)  BP: 108/71 (22 May 2023 05:25) (108/71 - 154/85)  BP(mean): --  RR: 18 (22 May 2023 05:25) (18 - 18)  SpO2: 97% (22 May 2023 05:25) (95% - 98%)    Parameters below as of 21 May 2023 16:11  Patient On (Oxygen Delivery Method): room air        PHYSICAL EXAM:  GENERAL: NAD, well-groomed, well-developed  HEENT : Conjuntivae  clear sclerae anicteric  NECK: Supple, No JVD, Normal thyroid  NERVOUS SYSTEM:  Alert oriented x2  no  focal  deficits;   CHEST/LUNG: Clear    HEART: Regular rate and rhythm; No murmurs, rubs, or gallops  ABDOMEN: Soft, Nontender, Nondistended; Bowel sounds present  EXTREMITIES  bilateral  tophaceous  messes both  elbows    SKIN: No rashes   LABS:                        14.0   11.91 )-----------( 211      ( 21 May 2023 07:35 )             42.7         132<L>  |  99  |  18  ----------------------------<  120<H>  4.0   |  28  |  0.52    Ca    8.9      21 May 2023 07:35    TPro  7.6  /  Alb  3.3  /  TBili  0.7  /  DBili  x   /  AST  18  /  ALT  19  /  AlkPhos  54        Urinalysis Basic - ( 21 May 2023 09:30 )    Color: Yellow / Appearance: Clear / S.010 / pH: x  Gluc: x / Ketone: Negative  / Bili: Negative / Urobili: Negative mg/dL   Blood: x / Protein: Negative mg/dL / Nitrite: Negative   Leuk Esterase: Negative / RBC: Negative /HPF / WBC Negative   Sq Epi: x / Non Sq Epi: x / Bacteria: Occasional      CAPILLARY BLOOD GLUCOSE          RADIOLOGY & ADDITIONAL TESTS:    Imaging reports  Personally Reviewed:  [ ] YES  [ ] NO    Consultant(s) Notes Reviewed:  [ ] YES  [ ] NO    Care Discussed with Consultants/Other Providers [x ] YES  [ ] NO    Problem/Plan - 1:  ·  Problem: Chest pain.   ·  Plan: metoprolol  asa  add  lipitor  nitrostat prn  unclear  if  LBB  old or  new   monitor  on telemetry  trend  troponins  cardiology  evaluation.    Problem/Plan - 2:  ·  Problem: History of CHF (congestive heart failure).   ·  Plan: metoprolol  cozaar  hctz  check tte  cardiology  eval.    Problem/Plan - 3:  ·  Problem: HTN (hypertension).   ·  Plan: metoprolol cozaar  hctz.    Problem/Plan - 4:  ·  Problem: Rheumatoid arthritis.   ·  Plan: advanced  deformities  tylenol prn.    Problem/Plan - 5:  ·  Problem: Social problem.   ·  Plan: concern  for  unsafe  home setting  for  social service evaluation.    Problem/Plan - 6:  ·  Problem: Anxiety.   ·  Plan: low  dose  zoloft  xanax  prn.

## 2023-05-22 NOTE — CONSULT NOTE ADULT - SUBJECTIVE AND OBJECTIVE BOX
CARDIOLOGY CONSULTATION NOTE                                                                             ALIYA WILSON is a 89y Female with pmh anxiety, htn, chf, RA, presenting with epigastric discomfort, vomiting, loose stools.  Has had this frequently before and typically goes to [facility] for this but ems brought her here.  Denies pain to me, feels burning discomfort.  No sob.  Vomiting pta, minimal nausea now.  No fevers.  No pshx.  Missed 7am carvedilol. Now  [facility]  has  CP  which  she  has  had multiple  times  in  past  states has congestive heart failure and  was told  needed  stents  but  refuses  because feels  they  rust  and  knows  several people  in her  apartment  complex  who  have    aftr  getting  hear  stents has  not  seen  her  cardilogist Dr Levi  for  several years  because  she  has no  way of  getting  to him states she did not take her medicatins this am is anxious  about  being  admitted  and  anxious  about  going  home  because  [facility]  needs  help has no  water   [facility]  has  a son in [place]  but  does not  have his  phone  number  with her  (21 May 2023 13:31)   REVIEW OF SYSTEMS: -----------------------------  CONSTITUTIONAL: No fever, weight loss, or fatigue EYES: No eye pain, visual disturbances, or discharge ENMT:  No difficulty hearing, tinnitus, vertigo; No sinus or throat pain NECK: No pain or stiffness BREASTS: No pain, masses, or nipple discharge RESPIRATORY: No cough, wheezing, chills or hemoptysis; No shortness of breath CARDIOVASCULAR: See HPI GASTROINTESTINAL: No abdominal or epigastric pain. No nausea, vomiting, or hematemesis; No diarrhea or constipation. No melena or hematochezia. GENITOURINARY: No dysuria, frequency, hematuria, or incontinence NEUROLOGICAL: No headaches, memory loss, loss of strength, numbness, or tremors SKIN: No itching, burning, rashes, or lesions  LYMPH NODES: No enlarged glands ENDOCRINE: No heat or cold intolerance; No hair loss MUSCULOSKELETAL: No joint pain or swelling; No muscle, back, or extremity pain PSYCHIATRIC: No depression, anxiety, mood swings, or difficulty sleeping HEME/LYMPH: No easy bruising, or bleeding gums ALLERGY AND IMMUNOLOGIC: No hives or eczema  Home Medications: aspirin 325 mg oral tablet: 1 tab(s) orally once a day (2015 14:43) hydrochlorothiazide-losartan 12.5 mg-100 mg oral tablet: 1 tab(s) orally once a day (2015 14:43) metoprolol 50 mg oral tablet, extended release: 1 tab(s) orally once a day (2015 14:43)   MEDICATIONS  (STANDING): aspirin 325 milliGRAM(s) Oral daily atorvastatin 20 milliGRAM(s) Oral at bedtime heparin   Injectable 5000 Unit(s) SubCutaneous every 12 hours hydrochlorothiazide 12.5 milliGRAM(s) Oral daily losartan 100 milliGRAM(s) Oral daily metoprolol succinate ER 50 milliGRAM(s) Oral daily sertraline 25 milliGRAM(s) Oral daily   ALLERGIES: No Known Allergies   FAMILY HISTORY:   PHYSICAL EXAMINATION: ----------------------------- T(C): 36.6 (23 @ 10:28), Max: 36.9 (23 @ 16:11) HR: 89 (23 @ 10:28) (64 - 90) BP: 157/99 (23 @ 10:28) (108/71 - 157/99) RR: 17 (23 @ 10:28) (17 - 18) SpO2: 98% (23 @ 10:28) (95% - 98%) Wt(kg): --   @ 07:01  -   @ 12:29 -------------------------------------------------------- IN: Total IN: 0 mL  OUT:   Voided (mL): 100 mL Total OUT: 100 mL  Total NET: -100 mL      Constitutional: well developed, normal appearance, well groomed, well nourished, no deformities and no acute distress.  Eyes: the conjunctiva exhibited no abnormalities and the eyelids demonstrated no xanthelasmas.  HEENT: normal oral mucosa, no oral pallor and no oral cyanosis.  Neck: normal jugular venous A waves present, normal jugular venous V waves present and no jugular venous vazquez A waves.  Pulmonary: no respiratory distress, normal respiratory rhythm and effort, no accessory muscle use and lungs were clear to auscultation bilaterally.  Cardiovascular: heart rate and rhythm were normal, normal S1 and S2 and no murmur, gallop, rub, heave or thrill are present.  Abdomen: soft, non-tender, no hepato-splenomegaly and no abdominal mass palpated.  Musculoskeletal: the gait could not be assessed..  Extremities: no clubbing of the fingernails, no localized cyanosis, no petechial hemorrhages and no ischemic changes.  Skin: normal skin color and pigmentation, no rash, no venous stasis, no skin lesions, no skin ulcer and no xanthoma was observed.  Psychiatric: oriented to person, place, and time, the affect was normal, the mood was normal and not feeling anxious.   ECG: -------  < from: 12 Lead ECG (23 @ 06:37) >  Ventricular Rate 113 BPM  Atrial Rate 113 BPM  P-R Interval 208 ms  QRS Duration 146 ms  Q-T Interval 368 ms  QTC Calculation(Bazett) 504 ms  R Axis -53 degrees  T Axis 109 degrees  Diagnosis Line Sinus tachycardia Left axis deviation Left bundle branch block Abnormal ECG No previous ECGs available Confirmed by JOSUE CRAIN MD ([id number]) on 2023 12:02:08 PM  < end of copied text >   LABS:  --------   132<L>  |  99  |  18 ----------------------------<  120<H> 4.0   |  28  |  0.52  Ca    8.9      21 May 2023 07:35  TPro  7.6  /  Alb  3.3  /  TBili  0.7  /  DBili  x   /  AST  18  /  ALT  19  /  AlkPhos  54                         14.0  11.91 )-----------( 211      ( 21 May 2023 07:35 )            42.7      Troponin I, High Sensitivity Result: 29.0:(23 @ 06:39)  Troponin I, High Sensitivity Result: 7.0:(23 @ 07:35)   RADIOLOGY REPORTS: -----------------------------  < from: Xray Chest 1 View AP/PA. (02.23.15 @ 16:41) >  EXAM:  CHEST SINGLE VIEW                         PROCEDURE DATE:  2015    INTERPRETATION:  Chest radiograph (one view)       CLINICAL INFORMATION: Weakness  TECHNIQUE:  Single frontal view of the chest was obtained.  FINDINGS:  No previous examinations are available for review.  The lungs are clear.  No pleural abnormality is seen.  Heart size and  mediastinal contours are within normal limits. There is a large  retrocardiac mass with a air-fluid level compatible with a hiatal hernia.  IMPRESSION: No evidence of active chest disease. Large hiatal hernia.   < end of copied text >   ECHOCARDIOGRAM: ---------------------------     CARDIOLOGY CONSULTATION NOTE                                                                             ALIYA WILSON is a 89y Female with pmh anxiety, htn, chf, RA, presenting with epigastric discomfort, vomiting, loose stools.  Has had this frequently before and typically goes to [facility] for this but ems brought her here.  Denies pain to me, feels burning discomfort.  No sob.  Vomiting pta, minimal nausea now.  No fevers.  No pshx.  Missed 7am carvedilol. Now  states  has  CP  which  she  has  had multiple  times  in  past  states has congestive heart failure and  was told  needed  stents  but  refuses  because feels  they  rust  and  knows  several people  in her  apartment  complex  who  have    aftr  getting  hear  stents. She last saw her cardiologist in  and had been followed by Dr. Trae Mendoza who does house calls and manages her medications.  She has been homebound for several years due to progressively worsening arthritis and severe kyphosis she has a history of multiple falls and a known left bundle branch block with a known cardiomyopathy and propensity congestive heart failure.  She had declined aggressive work-ups on several occasions in the past.  She has also been difficult to manage from a medication standpoint having stopped multiple medications for various reasons over the years. She suffers from anxiety , on benzodiazepines at home and is anxious  about  being  admitted  and  anxious  about  going  home  because  states  needs  help     REVIEW OF SYSTEMS: -----------------------------  CONSTITUTIONAL: No fever, weight loss, or fatigue EYES: No eye pain, visual disturbances, or discharge ENMT:  No difficulty hearing, tinnitus, vertigo; No sinus or throat pain NECK: No pain or stiffness BREASTS: No pain, masses, or nipple discharge RESPIRATORY: No cough, wheezing, chills or hemoptysis; No shortness of breath CARDIOVASCULAR: See HPI GASTROINTESTINAL: No abdominal or epigastric pain. No nausea, vomiting, or hematemesis; No diarrhea or constipation. No melena or hematochezia. GENITOURINARY: No dysuria, frequency, hematuria, or incontinence NEUROLOGICAL: No headaches, memory loss, loss of strength, numbness, or tremors SKIN: No itching, burning, rashes, or lesions  LYMPH NODES: No enlarged glands ENDOCRINE: No heat or cold intolerance; No hair loss MUSCULOSKELETAL: No joint pain or swelling; No muscle, back, or extremity pain PSYCHIATRIC: No depression, anxiety, mood swings, or difficulty sleeping HEME/LYMPH: No easy bruising, or bleeding gums ALLERGY AND IMMUNOLOGIC: No hives or eczema  Home Medications: aspirin 325 mg oral tablet: 1 tab(s) orally once a day (2015 14:43) hydrochlorothiazide-losartan 12.5 mg-100 mg oral tablet: 1 tab(s) orally once a day (2015 14:43) metoprolol 50 mg oral tablet, extended release: 1 tab(s) orally once a day (2015 14:43)   MEDICATIONS  (STANDING): aspirin 325 milliGRAM(s) Oral daily atorvastatin 20 milliGRAM(s) Oral at bedtime heparin   Injectable 5000 Unit(s) SubCutaneous every 12 hours hydrochlorothiazide 12.5 milliGRAM(s) Oral daily losartan 100 milliGRAM(s) Oral daily metoprolol succinate ER 50 milliGRAM(s) Oral daily sertraline 25 milliGRAM(s) Oral daily   ALLERGIES: No Known Allergies   FAMILY HISTORY:   PHYSICAL EXAMINATION: ----------------------------- T(C): 36.6 (23 @ 10:28), Max: 36.9 (23 @ 16:11) HR: 89 (23 @ 10:28) (64 - 90) BP: 157/99 (23 @ 10:28) (108/71 - 157/99) RR: 17 (23 @ 10:28) (17 - 18) SpO2: 98% (23 @ 10:28) (95% - 98%) Wt(kg): --   @ 07:01  -   @ 12:29 -------------------------------------------------------- IN: Total IN: 0 mL  OUT:   Voided (mL): 100 mL Total OUT: 100 mL  Total NET: -100 mL      Constitutional: well developed, normal appearance, well groomed, well nourished, no deformities and no acute distress.  Eyes: the conjunctiva exhibited no abnormalities and the eyelids demonstrated no xanthelasmas.  HEENT: normal oral mucosa, no oral pallor and no oral cyanosis.  Neck: normal jugular venous A waves present, normal jugular venous V waves present and no jugular venous vazquez A waves.  Pulmonary: no respiratory distress, normal respiratory rhythm and effort, no accessory muscle use and lungs were clear to auscultation bilaterally.  Cardiovascular: heart rate and rhythm were normal, normal S1 and S2 and no murmur, gallop, rub, heave or thrill are present.  Abdomen: soft, non-tender, no hepato-splenomegaly and no abdominal mass palpated.  Musculoskeletal: the gait could not be assessed..  Extremities: no clubbing of the fingernails, no localized cyanosis, no petechial hemorrhages and no ischemic changes.  Skin: normal skin color and pigmentation, no rash, no venous stasis, no skin lesions, no skin ulcer and no xanthoma was observed.  Psychiatric: oriented to person, place, and time, the affect was normal, the mood was normal and not feeling anxious.   ECG: -------  < from: 12 Lead ECG (23 @ 06:37) >  Ventricular Rate 113 BPM  Atrial Rate 113 BPM  P-R Interval 208 ms  QRS Duration 146 ms  Q-T Interval 368 ms  QTC Calculation(Bazett) 504 ms  R Axis -53 degrees  T Axis 109 degrees  Diagnosis Line Sinus tachycardia Left axis deviation Left bundle branch block Abnormal ECG No previous ECGs available Confirmed by JOSUE CRAIN MD ([id number]) on 2023 12:02:08 PM  < end of copied text >   LABS:  --------   132<L>  |  99  |  18 ----------------------------<  120<H> 4.0   |  28  |  0.52  Ca    8.9      21 May 2023 07:35  TPro  7.6  /  Alb  3.3  /  TBili  0.7  /  DBili  x   /  AST  18  /  ALT  19  /  AlkPhos  54                         14.0  11.91 )-----------( 211      ( 21 May 2023 07:35 )            42.7      Troponin I, High Sensitivity Result: 29.0:(23 @ 06:39)  Troponin I, High Sensitivity Result: 7.0:(23 @ 07:35)   RADIOLOGY REPORTS: -----------------------------  < from: Xray Chest 1 View AP/PA. (02.23.15 @ 16:41) >  EXAM:  CHEST SINGLE VIEW                         PROCEDURE DATE:  2015    INTERPRETATION:  Chest radiograph (one view)       CLINICAL INFORMATION: Weakness  TECHNIQUE:  Single frontal view of the chest was obtained.  FINDINGS:  No previous examinations are available for review.  The lungs are clear.  No pleural abnormality is seen.  Heart size and  mediastinal contours are within normal limits. There is a large  retrocardiac mass with a air-fluid level compatible with a hiatal hernia.  IMPRESSION: No evidence of active chest disease. Large hiatal hernia.   < end of copied text >   ECHOCARDIOGRAM: ---------------------------     CARDIOLOGY CONSULTATION NOTE                                                                             ALIYA WILSON is a 89y Female with pmh anxiety, htn, chf, RA, presenting with epigastric discomfort, vomiting, loose stools.  Has had this frequently before and typically goes to [facility] for this but ems brought her here.  Denies pain to me, feels burning discomfort.  No sob.  Vomiting pta, minimal nausea now.  No fevers.  No pshx.  Missed 7am carvedilol. Now  states  has  CP  which  she  has  had multiple  times  in  past  states has congestive heart failure and  was told  needed  stents  but  refuses  because feels  they  rust  and  knows  several people  in her  apartment  complex  who  have    aftr  getting  hear  stents. She last saw her cardiologist in  and had been followed by Dr. Trae Mendoza who does house calls and manages her medications.  She has been homebound for several years due to progressively worsening arthritis and severe kyphosis she has a history of multiple falls and a known left bundle branch block with a known cardiomyopathy and propensity congestive heart failure.  She had declined aggressive work-ups on several occasions in the past.  She has also been difficult to manage from a medication standpoint having stopped multiple medications for various reasons over the years, probably because of underlying psychopathology; on benzodiazepines at home.  Difficult historian, patient having paranoid delusions about her current situation as well.    REVIEW OF SYSTEMS: NA -----------------------------  Home Medications: aspirin 325 mg oral tablet: 1 tab(s) orally once a day (2015 14:43) hydrochlorothiazide-losartan 12.5 mg-100 mg oral tablet: 1 tab(s) orally once a day (2015 14:43) metoprolol 50 mg oral tablet, extended release: 1 tab(s) orally once a day (2015 14:43)   MEDICATIONS  (STANDING): aspirin 325 milliGRAM(s) Oral daily atorvastatin 20 milliGRAM(s) Oral at bedtime heparin   Injectable 5000 Unit(s) SubCutaneous every 12 hours hydrochlorothiazide 12.5 milliGRAM(s) Oral daily losartan 100 milliGRAM(s) Oral daily metoprolol succinate ER 50 milliGRAM(s) Oral daily sertraline 25 milliGRAM(s) Oral daily   ALLERGIES: No Known Allergies   FAMILY HISTORY:   PHYSICAL EXAMINATION: ----------------------------- T(C): 36.6 (23 @ 10:28), Max: 36.9 (23 @ 16:11) HR: 89 (23 @ 10:28) (64 - 90) BP: 157/99 (23 @ 10:28) (108/71 - 157/99) RR: 17 (23 @ 10:28) (17 - 18) SpO2: 98% (23 @ 10:28) (95% - 98%) Wt(kg): --   @ 07:01  -   @ 12:29 -------------------------------------------------------- IN: Total IN: 0 mL  OUT:   Voided (mL): 100 mL Total OUT: 100 mL  Total NET: -100 mL      Constitutional: well developed, normal appearance, well groomed, well nourished, no deformities and no acute distress.  Eyes: the conjunctiva exhibited no abnormalities and the eyelids demonstrated no xanthelasmas.  HEENT: normal oral mucosa, no oral pallor and no oral cyanosis.  Neck: normal jugular venous A waves present, normal jugular venous V waves present and no jugular venous vazquez A waves.  Pulmonary: no respiratory distress, normal respiratory rhythm and effort, no accessory muscle use and lungs were clear to auscultation bilaterally.  Cardiovascular: heart rate and rhythm were normal, normal S1 and S2 and no murmur, gallop, rub, heave or thrill are present.  Abdomen: soft, non-tender, no hepato-splenomegaly and no abdominal mass palpated.  Musculoskeletal: the gait could not be assessed. severe arthritic changes with debilitating hand deformities and tophaceous growth in both elbows. Extremities: no clubbing of the fingernails, no localized cyanosis, no petechial hemorrhages and no ischemic changes.  Skin: normal skin color and pigmentation, no rash, no venous stasis, no skin lesions, no skin ulcer and no xanthoma was observed.  Psychiatric: oriented to person, place, and time, the affect was normal, the mood was normal and not feeling anxious.   ECG: -------  < from: 12 Lead ECG (23 @ 06:37) >  Ventricular Rate 113 BPM  Atrial Rate 113 BPM  P-R Interval 208 ms  QRS Duration 146 ms  Q-T Interval 368 ms  QTC Calculation(Bazett) 504 ms  R Axis -53 degrees  T Axis 109 degrees  Diagnosis Line Sinus tachycardia Left axis deviation Left bundle branch block Abnormal ECG No previous ECGs available Confirmed by JOSUE CRAIN MD ([id number]) on 2023 12:02:08 PM  < end of copied text >   LABS:  --------   132<L>  |  99  |  18 ----------------------------<  120<H> 4.0   |  28  |  0.52  Ca    8.9      21 May 2023 07:35  TPro  7.6  /  Alb  3.3  /  TBili  0.7  /  DBili  x   /  AST  18  /  ALT  19  /  AlkPhos  54                         14.0  11.91 )-----------( 211      ( 21 May 2023 07:35 )            42.7      Troponin I, High Sensitivity Result: 29.0:(23 @ 06:39)  Troponin I, High Sensitivity Result: 7.0:(23 @ 07:35)   RADIOLOGY REPORTS: -----------------------------  < from: Xray Chest 1 View AP/PA. (02.23.15 @ 16:41) >  EXAM:  CHEST SINGLE VIEW                         PROCEDURE DATE:  2015    INTERPRETATION:  Chest radiograph (one view)       CLINICAL INFORMATION: Weakness  TECHNIQUE:  Single frontal view of the chest was obtained.  FINDINGS:  No previous examinations are available for review.  The lungs are clear.  No pleural abnormality is seen.  Heart size and  mediastinal contours are within normal limits. There is a large  retrocardiac mass with a air-fluid level compatible with a hiatal hernia.  IMPRESSION: No evidence of active chest disease. Large hiatal hernia.   < end of copied text >   ECHOCARDIOGRAM: ---------------------------

## 2023-05-23 ENCOUNTER — TRANSCRIPTION ENCOUNTER (OUTPATIENT)
Age: 88
End: 2023-05-23

## 2023-05-23 LAB
ALBUMIN SERPL ELPH-MCNC: 3 G/DL — LOW (ref 3.3–5)
ALP SERPL-CCNC: 43 U/L — SIGNIFICANT CHANGE UP (ref 40–120)
ALT FLD-CCNC: 17 U/L — SIGNIFICANT CHANGE UP (ref 12–78)
ANION GAP SERPL CALC-SCNC: 7 MMOL/L — SIGNIFICANT CHANGE UP (ref 5–17)
AST SERPL-CCNC: 20 U/L — SIGNIFICANT CHANGE UP (ref 15–37)
BILIRUB SERPL-MCNC: 0.8 MG/DL — SIGNIFICANT CHANGE UP (ref 0.2–1.2)
BUN SERPL-MCNC: 17 MG/DL — SIGNIFICANT CHANGE UP (ref 7–23)
CALCIUM SERPL-MCNC: 8.9 MG/DL — SIGNIFICANT CHANGE UP (ref 8.5–10.1)
CHLORIDE SERPL-SCNC: 101 MMOL/L — SIGNIFICANT CHANGE UP (ref 96–108)
CO2 SERPL-SCNC: 27 MMOL/L — SIGNIFICANT CHANGE UP (ref 22–31)
CREAT SERPL-MCNC: 0.51 MG/DL — SIGNIFICANT CHANGE UP (ref 0.5–1.3)
EGFR: 88 ML/MIN/1.73M2 — SIGNIFICANT CHANGE UP
GLUCOSE SERPL-MCNC: 86 MG/DL — SIGNIFICANT CHANGE UP (ref 70–99)
HCT VFR BLD CALC: 41.2 % — SIGNIFICANT CHANGE UP (ref 34.5–45)
HGB BLD-MCNC: 13.6 G/DL — SIGNIFICANT CHANGE UP (ref 11.5–15.5)
MCHC RBC-ENTMCNC: 29.5 PG — SIGNIFICANT CHANGE UP (ref 27–34)
MCHC RBC-ENTMCNC: 33 G/DL — SIGNIFICANT CHANGE UP (ref 32–36)
MCV RBC AUTO: 89.4 FL — SIGNIFICANT CHANGE UP (ref 80–100)
NRBC # BLD: 0 /100 WBCS — SIGNIFICANT CHANGE UP (ref 0–0)
PLATELET # BLD AUTO: 209 K/UL — SIGNIFICANT CHANGE UP (ref 150–400)
POTASSIUM SERPL-MCNC: 3.5 MMOL/L — SIGNIFICANT CHANGE UP (ref 3.5–5.3)
POTASSIUM SERPL-SCNC: 3.5 MMOL/L — SIGNIFICANT CHANGE UP (ref 3.5–5.3)
PROT SERPL-MCNC: 7 GM/DL — SIGNIFICANT CHANGE UP (ref 6–8.3)
RBC # BLD: 4.61 M/UL — SIGNIFICANT CHANGE UP (ref 3.8–5.2)
RBC # FLD: 13.4 % — SIGNIFICANT CHANGE UP (ref 10.3–14.5)
SODIUM SERPL-SCNC: 135 MMOL/L — SIGNIFICANT CHANGE UP (ref 135–145)
WBC # BLD: 5.49 K/UL — SIGNIFICANT CHANGE UP (ref 3.8–10.5)
WBC # FLD AUTO: 5.49 K/UL — SIGNIFICANT CHANGE UP (ref 3.8–10.5)

## 2023-05-23 PROCEDURE — 93306 TTE W/DOPPLER COMPLETE: CPT | Mod: 26

## 2023-05-23 RX ORDER — ATORVASTATIN CALCIUM 80 MG/1
1 TABLET, FILM COATED ORAL
Qty: 0 | Refills: 0 | DISCHARGE
Start: 2023-05-23

## 2023-05-23 RX ORDER — LOSARTAN POTASSIUM 100 MG/1
1 TABLET, FILM COATED ORAL
Qty: 0 | Refills: 0 | DISCHARGE
Start: 2023-05-23

## 2023-05-23 RX ORDER — ALPRAZOLAM 0.25 MG
1 TABLET ORAL
Qty: 0 | Refills: 0 | DISCHARGE
Start: 2023-05-23

## 2023-05-23 RX ORDER — ASPIRIN/CALCIUM CARB/MAGNESIUM 324 MG
1 TABLET ORAL
Qty: 0 | Refills: 0 | DISCHARGE
Start: 2023-05-23

## 2023-05-23 RX ORDER — SERTRALINE 25 MG/1
1 TABLET, FILM COATED ORAL
Qty: 0 | Refills: 0 | DISCHARGE
Start: 2023-05-23

## 2023-05-23 RX ORDER — METOPROLOL TARTRATE 50 MG
1 TABLET ORAL
Qty: 0 | Refills: 0 | DISCHARGE
Start: 2023-05-23

## 2023-05-23 RX ORDER — HYDROCHLOROTHIAZIDE 25 MG
1 TABLET ORAL
Qty: 0 | Refills: 0 | DISCHARGE
Start: 2023-05-23

## 2023-05-23 RX ADMIN — Medication 650 MILLIGRAM(S): at 22:25

## 2023-05-23 RX ADMIN — ATORVASTATIN CALCIUM 20 MILLIGRAM(S): 80 TABLET, FILM COATED ORAL at 21:18

## 2023-05-23 RX ADMIN — Medication 0.25 MILLIGRAM(S): at 08:11

## 2023-05-23 RX ADMIN — LOSARTAN POTASSIUM 100 MILLIGRAM(S): 100 TABLET, FILM COATED ORAL at 05:37

## 2023-05-23 RX ADMIN — Medication 650 MILLIGRAM(S): at 21:23

## 2023-05-23 RX ADMIN — SERTRALINE 25 MILLIGRAM(S): 25 TABLET, FILM COATED ORAL at 12:04

## 2023-05-23 RX ADMIN — Medication 325 MILLIGRAM(S): at 12:04

## 2023-05-23 RX ADMIN — HEPARIN SODIUM 5000 UNIT(S): 5000 INJECTION INTRAVENOUS; SUBCUTANEOUS at 05:39

## 2023-05-23 RX ADMIN — HEPARIN SODIUM 5000 UNIT(S): 5000 INJECTION INTRAVENOUS; SUBCUTANEOUS at 17:19

## 2023-05-23 RX ADMIN — Medication 0.25 MILLIGRAM(S): at 21:23

## 2023-05-23 NOTE — DISCHARGE NOTE PROVIDER - NSDCCPCAREPLAN_GEN_ALL_CORE_FT
PRINCIPAL DISCHARGE DIAGNOSIS  Diagnosis: Chest pain  Assessment and Plan of Treatment:       SECONDARY DISCHARGE DIAGNOSES  Diagnosis: Adult failure to thrive  Assessment and Plan of Treatment:

## 2023-05-23 NOTE — PHYSICAL THERAPY INITIAL EVALUATION ADULT - ADDITIONAL COMMENTS
pt encountered in bed supine, aaox3, pt uncooperative, P.T zahidaal observed as pt was agitated and got up from bed with PCA supervision and min a for ambulation, pt refused the use of RW, + ervin hand ulnar deviation, osteoporosis, and require min a to prevent fall. pt lives a senior citizen home alone.

## 2023-05-23 NOTE — DIETITIAN NUTRITION RISK NOTIFICATION - TREATMENT: THE FOLLOWING DIET HAS BEEN RECOMMENDED
Diet, Soft and Bite Sized:   Supplement Feeding Modality:  Oral  Ensure Plus High Protein Cans or Servings Per Day:  1       Frequency:  Three Times a day (05-23-23 @ 11:48) [Pending Verification By Attending]  Diet, Soft and Bite Sized (05-21-23 @ 16:26) [Active]

## 2023-05-23 NOTE — DISCHARGE NOTE PROVIDER - DETAILS OF MALNUTRITION DIAGNOSIS/DIAGNOSES
This patient has been assessed with a concern for Malnutrition and was treated during this hospitalization for the following Nutrition diagnosis/diagnoses:     -  05/23/2023: Severe protein-calorie malnutrition   -  05/23/2023: Underweight (BMI < 19)

## 2023-05-23 NOTE — PROGRESS NOTE ADULT - SUBJECTIVE AND OBJECTIVE BOX
INTERVAL HPI/OVERNIGHT EVENTS:        REVIEW OF SYSTEMS:  CONSTITUTIONAL:  patient  comfortable  cooperative  no  complaints    NECK: No pain or stiffnes  RESPIRATORY: No SOB   CARDIOVASCULAR: No chest pain, palpitations, dizziness,   GASTROINTESTINAL: No abdominal pain. No nausea, vomiting,   NEUROLOGICAL: No headaches, no  blurry  vision no  dizziness  SKIN: No itching,   MUSCULOSKELETAL: No pain    MEDICATION:  acetaminophen     Tablet .. 650 milliGRAM(s) Oral every 6 hours PRN  ALPRAZolam 0.25 milliGRAM(s) Oral two times a day PRN  aspirin 325 milliGRAM(s) Oral daily  atorvastatin 20 milliGRAM(s) Oral at bedtime  heparin   Injectable 5000 Unit(s) SubCutaneous every 12 hours  hydrochlorothiazide 12.5 milliGRAM(s) Oral daily  losartan 100 milliGRAM(s) Oral daily  metoprolol succinate ER 50 milliGRAM(s) Oral daily  nitroglycerin     SubLingual 0.4 milliGRAM(s) SubLingual every 5 minutes PRN  sertraline 25 milliGRAM(s) Oral daily    Vital Signs Last 24 Hrs  T(C): 36.7 (23 May 2023 11:17), Max: 37.1 (22 May 2023 15:58)  T(F): 98.1 (23 May 2023 11:17), Max: 98.7 (22 May 2023 15:58)  HR: 104 (23 May 2023 11:17) (75 - 104)  BP: 137/91 (23 May 2023 11:17) (114/77 - 163/101)  BP(mean): --  RR: 18 (23 May 2023 11:17) (17 - 18)  SpO2: 96% (23 May 2023 11:17) (93% - 98%)    Parameters below as of 23 May 2023 11:17  Patient On (Oxygen Delivery Method): room air        PHYSICAL EXAM:  GENERAL: NAD, well-groomed, well-developed  HEENT : Conjuntivae  clear sclerae anicteric  NECK: Supple, No JVD, Normal thyroid  NERVOUS SYSTEM:  Alert oriented x2   no  focal  deficits;   CHEST/LUNG: Clear    HEART: Regular rate and rhythm; No murmurs, rubs, or gallops  ABDOMEN: Soft, Nontender, Nondistended; Bowel sounds present  EXTREMITIES:  no  edema no  tenderness  bilateral  tophaceous  masses  both  elbows L>R   SKIN: No rashes   LABS:                        13.6   5.49  )-----------( 209      ( 23 May 2023 06:00 )             41.2     05-23    135  |  101  |  17  ----------------------------<  86  3.5   |  27  |  0.51    Ca    8.9      23 May 2023 06:00    TPro  7.0  /  Alb  3.0<L>  /  TBili  0.8  /  DBili  x   /  AST  20  /  ALT  17  /  AlkPhos  43  05-23        CAPILLARY BLOOD GLUCOSE          RADIOLOGY & ADDITIONAL TESTS:    Imaging reports  Personally Reviewed:  [ ] YES  [ ] NO    Consultant(s) Notes Reviewed:  [ x] YES  [ ] NO    Care Discussed with Consultants/Other Providers [x ] YES  [ ] NO  Problem/Plan - 1:  ·  Problem: Chest pain.   ·  Plan: metoprolol  asa  add  lipitor  nitrostat prn   LBB  reprted  old   monitor  on telemetry  trend  troponins  cardiology  evaluation. appreciated    patient  does not  want  any w/u  wants  help  at  home  for PT evaluation    Problem/Plan - 2:  ·  Problem: History of CHF (congestive heart failure).   ·  Plan: metoprolol  cozaar  hctz     Problem/Plan - 3:  ·  Problem: HTN (hypertension).   ·  Plan: metoprolol cozaar  hctz.    Problem/Plan - 4:  ·  Problem: Rheumatoid arthritis.   ·  Plan: advanced  deformities  tylenol prn.    Problem/Plan - 5:  ·  Problem: Social problem.   ·  Plan: concern  for  unsafe  home setting  for  social service evaluation.    Problem/Plan - 6:  ·  Problem: Anxiety.   ·  Plan: low  dose  zoloft  xanax  prn.         INTERVAL HPI/OVERNIGHT EVENTS:        REVIEW OF SYSTEMS:  CONSTITUTIONAL:  patient  comfortable  cooperative  no  complaints    NECK: No pain or stiffnes  RESPIRATORY: No SOB   CARDIOVASCULAR: No chest pain, palpitations, dizziness,   GASTROINTESTINAL: No abdominal pain. No nausea, vomiting,   NEUROLOGICAL: No headaches, no  blurry  vision no  dizziness  SKIN: No itching,   MUSCULOSKELETAL: No pain    MEDICATION:  acetaminophen     Tablet .. 650 milliGRAM(s) Oral every 6 hours PRN  ALPRAZolam 0.25 milliGRAM(s) Oral two times a day PRN  aspirin 325 milliGRAM(s) Oral daily  atorvastatin 20 milliGRAM(s) Oral at bedtime  heparin   Injectable 5000 Unit(s) SubCutaneous every 12 hours  hydrochlorothiazide 12.5 milliGRAM(s) Oral daily  losartan 100 milliGRAM(s) Oral daily  metoprolol succinate ER 50 milliGRAM(s) Oral daily  nitroglycerin     SubLingual 0.4 milliGRAM(s) SubLingual every 5 minutes PRN  sertraline 25 milliGRAM(s) Oral daily    Vital Signs Last 24 Hrs  T(C): 36.7 (23 May 2023 11:17), Max: 37.1 (22 May 2023 15:58)  T(F): 98.1 (23 May 2023 11:17), Max: 98.7 (22 May 2023 15:58)  HR: 104 (23 May 2023 11:17) (75 - 104)  BP: 137/91 (23 May 2023 11:17) (114/77 - 163/101)  BP(mean): --  RR: 18 (23 May 2023 11:17) (17 - 18)  SpO2: 96% (23 May 2023 11:17) (93% - 98%)    Parameters below as of 23 May 2023 11:17  Patient On (Oxygen Delivery Method): room air        PHYSICAL EXAM:  GENERAL: NAD, well-groomed, well-developed  HEENT : Conjuntivae  clear sclerae anicteric  NECK: Supple, No JVD, Normal thyroid  NERVOUS SYSTEM:  Alert oriented x2   no  focal  deficits;   CHEST/LUNG: Clear    HEART: Regular rate and rhythm; No murmurs, rubs, or gallops  ABDOMEN: Soft, Nontender, Nondistended; Bowel sounds present  EXTREMITIES:  no  edema no  tenderness  bilateral  tophaceous  masses  both  elbows L>R  severe  bilateral MCP deformities  with  flexure  contractures  SKIN: No rashes   LABS:                        13.6   5.49  )-----------( 209      ( 23 May 2023 06:00 )             41.2     05-23    135  |  101  |  17  ----------------------------<  86  3.5   |  27  |  0.51    Ca    8.9      23 May 2023 06:00    TPro  7.0  /  Alb  3.0<L>  /  TBili  0.8  /  DBili  x   /  AST  20  /  ALT  17  /  AlkPhos  43  05-23        CAPILLARY BLOOD GLUCOSE          RADIOLOGY & ADDITIONAL TESTS:    Imaging reports  Personally Reviewed:  [ ] YES  [ ] NO    Consultant(s) Notes Reviewed:  [ x] YES  [ ] NO    Care Discussed with Consultants/Other Providers [x ] YES  [ ] NO  Problem/Plan - 1:  ·  Problem: Chest pain.   ·  Plan: metoprolol  asa  add  lipitor  nitrostat prn   LBB  reprted  old   monitor  on telemetry  trend  troponins  cardiology  evaluation. appreciated    patient  does not  want  any w/u  wants  help  at  home  for PT evaluation    Problem/Plan - 2:  ·  Problem: History of CHF (congestive heart failure).   ·  Plan: metoprolol  cozaar  hctz     Problem/Plan - 3:  ·  Problem: HTN (hypertension).   ·  Plan: metoprolol cozaar  hctz.    Problem/Plan - 4:  ·  Problem: Rheumatoid arthritis.   ·  Plan: advanced  deformities  tylenol prn.    Problem/Plan - 5:  ·  Problem: Social problem.   ·  Plan: concern  for  unsafe  home setting  for  social service evaluation.    Problem/Plan - 6:  ·  Problem: Anxiety.   ·  Plan: low  dose  zoloft  xanax  prn.

## 2023-05-23 NOTE — DIETITIAN INITIAL EVALUATION ADULT - FACTORS AFF FOOD INTAKE
limited financial resources/difficulty chewing/difficulty with food procurement/preparation/other (specify)

## 2023-05-23 NOTE — DISCHARGE NOTE PROVIDER - NSDCMRMEDTOKEN_GEN_ALL_CORE_FT
ALPRAZolam 0.25 mg oral tablet: 1 tab(s) orally 2 times a day As needed anxiety  Antivert 25 mg oral tablet: 1 tab(s) orally 3 times a day, As Needed - for dizziness  aspirin 325 mg oral tablet: 1 tab(s) orally once a day  atorvastatin 20 mg oral tablet: 1 tab(s) orally once a day (at bedtime)  hydroCHLOROthiazide 12.5 mg oral capsule: 1 cap(s) orally once a day  losartan 100 mg oral tablet: 1 tab(s) orally once a day  metoprolol succinate 50 mg oral tablet, extended release: 1 tab(s) orally once a day  sertraline 25 mg oral tablet: 1 tab(s) orally once a day

## 2023-05-23 NOTE — DIETITIAN INITIAL EVALUATION ADULT - OTHER INFO
Pt lives alone in senior housing; reports she has difficulty obtaining food & preparing it due to multiple issues (legally blind, severe RA, stove does not operate properly, no one to assist her); reports she mostly eats sandwiches; sometime soup & other easy to prepare items; suggested community resources I could provide but pt not interested; reports she has information at home & the food that has been provided on occasion was not satisfactory. Spoke to floor PA; she advised pt most likely will go to rehab when medically stable; SW consult pending for unsafe home setting. Pt reports wt loss as noted. Soft & Bite-sized consistency is acceptable to pt; receptive to nutritional supplement. Denies any N/V/C/D.

## 2023-05-23 NOTE — DISCHARGE NOTE PROVIDER - HOSPITAL COURSE
Chest pain.    metoprolol  asa  add  lipitor  nitrostat prn  Seen by cardiology inpatient, follow up with outpatient cardiology    concern  for  unsafe  home setting  for  social service evaluation.    Anxiety.   low  dose  zoloft  xanax  prn    On 5/23/23 this case was reviewed with Dr. Herrera, the patient is medically stable and optimized for discharge. All medications were reviewed and appropriate prescriptions were sent to mutually agreed upon pharmacy.

## 2023-05-23 NOTE — DIETITIAN INITIAL EVALUATION ADULT - ETIOLOGY
Inadequate energy/protein intake related to difficulty to food procurement & preparation (severe RA, legally blind, financial restraints)

## 2023-05-23 NOTE — DIETITIAN INITIAL EVALUATION ADULT - NUTRITIONGOAL OUTCOME1
Pt to consume >50% meals/supplements during LOS; pt to be provided c community resources to insure safe d/c

## 2023-05-23 NOTE — DIETITIAN INITIAL EVALUATION ADULT - PERTINENT LABORATORY DATA
05-23    135  |  101  |  17  ----------------------------<  86  3.5   |  27  |  0.51    Ca    8.9      23 May 2023 06:00    TPro  7.0  /  Alb  3.0<L>  /  TBili  0.8  /  DBili  x   /  AST  20  /  ALT  17  /  AlkPhos  43  05-23

## 2023-05-24 ENCOUNTER — TRANSCRIPTION ENCOUNTER (OUTPATIENT)
Age: 88
End: 2023-05-24

## 2023-05-24 VITALS
HEART RATE: 101 BPM | OXYGEN SATURATION: 97 % | DIASTOLIC BLOOD PRESSURE: 89 MMHG | SYSTOLIC BLOOD PRESSURE: 145 MMHG | RESPIRATION RATE: 18 BRPM | TEMPERATURE: 98 F

## 2023-05-24 RX ADMIN — Medication 50 MILLIGRAM(S): at 05:57

## 2023-05-24 RX ADMIN — LOSARTAN POTASSIUM 100 MILLIGRAM(S): 100 TABLET, FILM COATED ORAL at 05:56

## 2023-05-24 RX ADMIN — HEPARIN SODIUM 5000 UNIT(S): 5000 INJECTION INTRAVENOUS; SUBCUTANEOUS at 05:56

## 2023-05-24 RX ADMIN — Medication 0.25 MILLIGRAM(S): at 11:49

## 2023-05-24 NOTE — DISCHARGE NOTE NURSING/CASE MANAGEMENT/SOCIAL WORK - NSDCPEFALRISK_GEN_ALL_CORE
For information on Fall & Injury Prevention, visit: [email or web address] OR  [email or web address] OR  https://www.cdc.gov/steadi/patient.html

## 2023-05-24 NOTE — DISCHARGE NOTE NURSING/CASE MANAGEMENT/SOCIAL WORK - PATIENT PORTAL LINK FT
screening for BMI You can access the FollowMyHealth Patient Portal offered by [facility] by registering at the following website: [email or web address]. By joining [facility]’s FollowMyHealth portal, you will also be able to view your health information using other applications (apps) compatible with our system.

## 2023-05-25 ENCOUNTER — RX RENEWAL (OUTPATIENT)
Age: 88
End: 2023-05-25

## 2023-05-25 RX ORDER — SIMVASTATIN 40 MG/1
40 TABLET, FILM COATED ORAL DAILY
Qty: 90 | Refills: 3 | Status: ACTIVE | COMMUNITY
Start: 2023-05-25 | End: 1900-01-01

## 2023-05-26 RX ORDER — CARVEDILOL 6.25 MG/1
6.25 TABLET, FILM COATED ORAL
Qty: 180 | Refills: 3 | Status: ACTIVE | COMMUNITY
Start: 1900-01-01 | End: 1900-01-01

## 2023-06-01 DIAGNOSIS — Z91.81 HISTORY OF FALLING: ICD-10-CM

## 2023-06-01 DIAGNOSIS — I11.0 HYPERTENSIVE HEART DISEASE WITH HEART FAILURE: ICD-10-CM

## 2023-06-01 DIAGNOSIS — Z53.29 PROCEDURE AND TREATMENT NOT CARRIED OUT BECAUSE OF PATIENT'S DECISION FOR OTHER REASONS: ICD-10-CM

## 2023-06-01 DIAGNOSIS — I25.10 ATHEROSCLEROTIC HEART DISEASE OF NATIVE CORONARY ARTERY WITHOUT ANGINA PECTORIS: ICD-10-CM

## 2023-06-01 DIAGNOSIS — Z79.82 LONG TERM (CURRENT) USE OF ASPIRIN: ICD-10-CM

## 2023-06-01 DIAGNOSIS — F22 DELUSIONAL DISORDERS: ICD-10-CM

## 2023-06-01 DIAGNOSIS — M06.9 RHEUMATOID ARTHRITIS, UNSPECIFIED: ICD-10-CM

## 2023-06-01 DIAGNOSIS — R63.6 UNDERWEIGHT: ICD-10-CM

## 2023-06-01 DIAGNOSIS — R62.7 ADULT FAILURE TO THRIVE: ICD-10-CM

## 2023-06-01 DIAGNOSIS — R07.89 OTHER CHEST PAIN: ICD-10-CM

## 2023-06-01 DIAGNOSIS — F41.9 ANXIETY DISORDER, UNSPECIFIED: ICD-10-CM

## 2023-06-01 DIAGNOSIS — I50.9 HEART FAILURE, UNSPECIFIED: ICD-10-CM

## 2023-06-01 DIAGNOSIS — I44.7 LEFT BUNDLE-BRANCH BLOCK, UNSPECIFIED: ICD-10-CM

## 2023-06-01 DIAGNOSIS — E43 UNSPECIFIED SEVERE PROTEIN-CALORIE MALNUTRITION: ICD-10-CM

## 2023-06-01 DIAGNOSIS — I42.9 CARDIOMYOPATHY, UNSPECIFIED: ICD-10-CM

## 2023-06-01 DIAGNOSIS — Z91.148 PATIENT'S OTHER NONCOMPLIANCE WITH MEDICATION REGIMEN FOR OTHER REASON: ICD-10-CM

## 2023-06-01 DIAGNOSIS — R07.9 CHEST PAIN, UNSPECIFIED: ICD-10-CM

## 2023-08-15 ENCOUNTER — NON-APPOINTMENT (OUTPATIENT)
Age: 88
End: 2023-08-15

## 2023-10-08 ENCOUNTER — NON-APPOINTMENT (OUTPATIENT)
Age: 88
End: 2023-10-08

## 2023-12-07 ENCOUNTER — NON-APPOINTMENT (OUTPATIENT)
Age: 88
End: 2023-12-07

## 2024-02-14 ENCOUNTER — RX RENEWAL (OUTPATIENT)
Age: 89
End: 2024-02-14

## 2024-04-02 ENCOUNTER — NON-APPOINTMENT (OUTPATIENT)
Age: 89
End: 2024-04-02

## 2024-04-02 RX ORDER — ALPRAZOLAM 0.25 MG/1
0.25 TABLET ORAL
Qty: 120 | Refills: 0 | Status: ACTIVE | COMMUNITY
Start: 2022-04-27 | End: 1900-01-01

## 2024-04-21 ENCOUNTER — INPATIENT (INPATIENT)
Facility: HOSPITAL | Age: 89
LOS: 2 days | Discharge: NURSING, REHAB OR LONG-TERM CARE | End: 2024-04-24
Attending: INTERNAL MEDICINE | Admitting: INTERNAL MEDICINE
Payer: MEDICARE

## 2024-04-21 VITALS
HEART RATE: 108 BPM | DIASTOLIC BLOOD PRESSURE: 90 MMHG | TEMPERATURE: 98 F | SYSTOLIC BLOOD PRESSURE: 158 MMHG | WEIGHT: 110.01 LBS | OXYGEN SATURATION: 97 % | RESPIRATION RATE: 18 BRPM

## 2024-04-21 PROBLEM — Z86.79 PERSONAL HISTORY OF OTHER DISEASES OF THE CIRCULATORY SYSTEM: Chronic | Status: ACTIVE | Noted: 2023-05-21

## 2024-04-21 PROBLEM — I25.10 ATHEROSCLEROTIC HEART DISEASE OF NATIVE CORONARY ARTERY WITHOUT ANGINA PECTORIS: Chronic | Status: ACTIVE | Noted: 2023-05-21

## 2024-04-21 LAB
ALBUMIN SERPL ELPH-MCNC: 3.4 G/DL — SIGNIFICANT CHANGE UP (ref 3.3–5)
ALP SERPL-CCNC: 58 U/L — SIGNIFICANT CHANGE UP (ref 40–120)
ALT FLD-CCNC: 18 U/L — SIGNIFICANT CHANGE UP (ref 12–78)
ANION GAP SERPL CALC-SCNC: 9 MMOL/L — SIGNIFICANT CHANGE UP (ref 5–17)
APTT BLD: 37.4 SEC — HIGH (ref 24.5–35.6)
AST SERPL-CCNC: 28 U/L — SIGNIFICANT CHANGE UP (ref 15–37)
BASOPHILS # BLD AUTO: 0.02 K/UL — SIGNIFICANT CHANGE UP (ref 0–0.2)
BASOPHILS NFR BLD AUTO: 0.2 % — SIGNIFICANT CHANGE UP (ref 0–2)
BILIRUB SERPL-MCNC: 0.7 MG/DL — SIGNIFICANT CHANGE UP (ref 0.2–1.2)
BLD GP AB SCN SERPL QL: SIGNIFICANT CHANGE UP
BUN SERPL-MCNC: 17 MG/DL — SIGNIFICANT CHANGE UP (ref 7–23)
CALCIUM SERPL-MCNC: 9.2 MG/DL — SIGNIFICANT CHANGE UP (ref 8.5–10.1)
CHLORIDE SERPL-SCNC: 105 MMOL/L — SIGNIFICANT CHANGE UP (ref 96–108)
CO2 SERPL-SCNC: 23 MMOL/L — SIGNIFICANT CHANGE UP (ref 22–31)
CREAT SERPL-MCNC: 0.63 MG/DL — SIGNIFICANT CHANGE UP (ref 0.5–1.3)
EGFR: 83 ML/MIN/1.73M2 — SIGNIFICANT CHANGE UP
EOSINOPHIL # BLD AUTO: 0.02 K/UL — SIGNIFICANT CHANGE UP (ref 0–0.5)
EOSINOPHIL NFR BLD AUTO: 0.2 % — SIGNIFICANT CHANGE UP (ref 0–6)
GLUCOSE SERPL-MCNC: 103 MG/DL — HIGH (ref 70–99)
HCT VFR BLD CALC: 43.4 % — SIGNIFICANT CHANGE UP (ref 34.5–45)
HGB BLD-MCNC: 14 G/DL — SIGNIFICANT CHANGE UP (ref 11.5–15.5)
IMM GRANULOCYTES NFR BLD AUTO: 0.4 % — SIGNIFICANT CHANGE UP (ref 0–0.9)
INR BLD: 1.06 RATIO — SIGNIFICANT CHANGE UP (ref 0.85–1.18)
LYMPHOCYTES # BLD AUTO: 1.01 K/UL — SIGNIFICANT CHANGE UP (ref 1–3.3)
LYMPHOCYTES # BLD AUTO: 12.4 % — LOW (ref 13–44)
MCHC RBC-ENTMCNC: 29.4 PG — SIGNIFICANT CHANGE UP (ref 27–34)
MCHC RBC-ENTMCNC: 32.3 G/DL — SIGNIFICANT CHANGE UP (ref 32–36)
MCV RBC AUTO: 91 FL — SIGNIFICANT CHANGE UP (ref 80–100)
MONOCYTES # BLD AUTO: 0.5 K/UL — SIGNIFICANT CHANGE UP (ref 0–0.9)
MONOCYTES NFR BLD AUTO: 6.1 % — SIGNIFICANT CHANGE UP (ref 2–14)
NEUTROPHILS # BLD AUTO: 6.56 K/UL — SIGNIFICANT CHANGE UP (ref 1.8–7.4)
NEUTROPHILS NFR BLD AUTO: 80.7 % — HIGH (ref 43–77)
NRBC # BLD: 0 /100 WBCS — SIGNIFICANT CHANGE UP (ref 0–0)
PLATELET # BLD AUTO: 204 K/UL — SIGNIFICANT CHANGE UP (ref 150–400)
POTASSIUM SERPL-MCNC: 3.7 MMOL/L — SIGNIFICANT CHANGE UP (ref 3.5–5.3)
POTASSIUM SERPL-SCNC: 3.7 MMOL/L — SIGNIFICANT CHANGE UP (ref 3.5–5.3)
PROT SERPL-MCNC: 8 GM/DL — SIGNIFICANT CHANGE UP (ref 6–8.3)
PROTHROM AB SERPL-ACNC: 12.6 SEC — SIGNIFICANT CHANGE UP (ref 9.5–13)
RBC # BLD: 4.77 M/UL — SIGNIFICANT CHANGE UP (ref 3.8–5.2)
RBC # FLD: 14.1 % — SIGNIFICANT CHANGE UP (ref 10.3–14.5)
SODIUM SERPL-SCNC: 137 MMOL/L — SIGNIFICANT CHANGE UP (ref 135–145)
WBC # BLD: 8.14 K/UL — SIGNIFICANT CHANGE UP (ref 3.8–10.5)
WBC # FLD AUTO: 8.14 K/UL — SIGNIFICANT CHANGE UP (ref 3.8–10.5)

## 2024-04-21 PROCEDURE — 73070 X-RAY EXAM OF ELBOW: CPT | Mod: 26,50

## 2024-04-21 PROCEDURE — 93010 ELECTROCARDIOGRAM REPORT: CPT

## 2024-04-21 PROCEDURE — 99285 EMERGENCY DEPT VISIT HI MDM: CPT

## 2024-04-21 PROCEDURE — 99222 1ST HOSP IP/OBS MODERATE 55: CPT

## 2024-04-21 RX ORDER — ALPRAZOLAM 0.25 MG
0.25 TABLET ORAL ONCE
Refills: 0 | Status: DISCONTINUED | OUTPATIENT
Start: 2024-04-21 | End: 2024-04-21

## 2024-04-21 RX ORDER — CHOLECALCIFEROL (VITAMIN D3) 125 MCG
1000 CAPSULE ORAL DAILY
Refills: 0 | Status: DISCONTINUED | OUTPATIENT
Start: 2024-04-21 | End: 2024-04-24

## 2024-04-21 RX ORDER — ACETAMINOPHEN 500 MG
650 TABLET ORAL EVERY 6 HOURS
Refills: 0 | Status: DISCONTINUED | OUTPATIENT
Start: 2024-04-21 | End: 2024-04-24

## 2024-04-21 RX ORDER — LANOLIN ALCOHOL/MO/W.PET/CERES
3 CREAM (GRAM) TOPICAL AT BEDTIME
Refills: 0 | Status: DISCONTINUED | OUTPATIENT
Start: 2024-04-21 | End: 2024-04-24

## 2024-04-21 RX ORDER — ATORVASTATIN CALCIUM 80 MG/1
20 TABLET, FILM COATED ORAL AT BEDTIME
Refills: 0 | Status: DISCONTINUED | OUTPATIENT
Start: 2024-04-21 | End: 2024-04-24

## 2024-04-21 RX ORDER — HALOPERIDOL DECANOATE 100 MG/ML
5 INJECTION INTRAMUSCULAR ONCE
Refills: 0 | Status: COMPLETED | OUTPATIENT
Start: 2024-04-21 | End: 2024-04-21

## 2024-04-21 RX ORDER — ONDANSETRON 8 MG/1
4 TABLET, FILM COATED ORAL EVERY 8 HOURS
Refills: 0 | Status: DISCONTINUED | OUTPATIENT
Start: 2024-04-21 | End: 2024-04-24

## 2024-04-21 RX ORDER — ASCORBIC ACID 60 MG
500 TABLET,CHEWABLE ORAL DAILY
Refills: 0 | Status: DISCONTINUED | OUTPATIENT
Start: 2024-04-21 | End: 2024-04-24

## 2024-04-21 RX ORDER — SERTRALINE 25 MG/1
25 TABLET, FILM COATED ORAL DAILY
Refills: 0 | Status: DISCONTINUED | OUTPATIENT
Start: 2024-04-21 | End: 2024-04-24

## 2024-04-21 RX ORDER — ACETAMINOPHEN 500 MG
650 TABLET ORAL ONCE
Refills: 0 | Status: COMPLETED | OUTPATIENT
Start: 2024-04-21 | End: 2024-04-21

## 2024-04-21 RX ORDER — LOSARTAN POTASSIUM 100 MG/1
100 TABLET, FILM COATED ORAL DAILY
Refills: 0 | Status: DISCONTINUED | OUTPATIENT
Start: 2024-04-21 | End: 2024-04-21

## 2024-04-21 RX ORDER — ASPIRIN/CALCIUM CARB/MAGNESIUM 324 MG
325 TABLET ORAL DAILY
Refills: 0 | Status: DISCONTINUED | OUTPATIENT
Start: 2024-04-21 | End: 2024-04-21

## 2024-04-21 RX ORDER — MECLIZINE HCL 12.5 MG
25 TABLET ORAL THREE TIMES A DAY
Refills: 0 | Status: DISCONTINUED | OUTPATIENT
Start: 2024-04-21 | End: 2024-04-21

## 2024-04-21 RX ORDER — MULTIVIT-MIN/FERROUS GLUCONATE 9 MG/15 ML
1 LIQUID (ML) ORAL DAILY
Refills: 0 | Status: DISCONTINUED | OUTPATIENT
Start: 2024-04-21 | End: 2024-04-24

## 2024-04-21 RX ORDER — METOPROLOL TARTRATE 50 MG
50 TABLET ORAL DAILY
Refills: 0 | Status: DISCONTINUED | OUTPATIENT
Start: 2024-04-21 | End: 2024-04-24

## 2024-04-21 RX ADMIN — HALOPERIDOL DECANOATE 5 MILLIGRAM(S): 100 INJECTION INTRAMUSCULAR at 06:29

## 2024-04-21 RX ADMIN — ATORVASTATIN CALCIUM 20 MILLIGRAM(S): 80 TABLET, FILM COATED ORAL at 22:11

## 2024-04-21 RX ADMIN — Medication 3 MILLIGRAM(S): at 22:11

## 2024-04-21 RX ADMIN — Medication 0.25 MILLIGRAM(S): at 12:14

## 2024-04-21 NOTE — H&P ADULT - HISTORY OF PRESENT ILLNESS
89 y/o F , w/ severe malnutrition, and pmh of RA w/ b/l Elbow soft tissue swelling/ masses Left bigger than Rt, also w/ Joint deformities of fingers, hx CAD, CHF, HTn (pt denies being on blood thinners, but has aspirin 325 mg on her med list), brought in by EMS for left elbow bleeding from a softball size tumor/growth on elbow.  Per Pt, she has had this growth for a long time on both elbows, likely sec to RA.  yesterday she started to bleed from left elbow mass due to superficial laceration , in ED pressure bandage was applied and bleeding stopped  Pt denies any pain, gives hx generalized weakness and lives alone at home, does not feel safe going home  early am she was agitated in ED and required haldol dose, currently calm, quite and cooperative. AAOx2 with forgetfulness, and [place]   she denies any other complaints  pt states she has not seen her cardiologist in one year.   pt is poor historian and pleasantly /mildly confused.   per Ed attending pt requires admission to arrange for SNF as she lives alone and not safe to go home. , son lives in [place]

## 2024-04-21 NOTE — H&P ADULT - NSHPLABSRESULTS_GEN_ALL_CORE
14.0   8.14  )-----------( 204      ( 21 Apr 2024 04:05 )             43.4     04-21    137  |  105  |  17  ----------------------------<  103<H>  3.7   |  23  |  0.63    Ca    9.2      21 Apr 2024 04:05    TPro  8.0  /  Alb  3.4  /  TBili  0.7  /  DBili  x   /  AST  28  /  ALT  18  /  AlkPhos  58  04-21

## 2024-04-21 NOTE — H&P ADULT - NSHPPHYSICALEXAM_GEN_ALL_CORE
Vital Signs Last 24 Hrs  T(C): 36.4 (21 Apr 2024 15:42), Max: 36.8 (21 Apr 2024 00:40)  T(F): 97.6 (21 Apr 2024 15:42), Max: 98.2 (21 Apr 2024 00:40)  HR: 75 (21 Apr 2024 15:42) (75 - 108)  BP: 126/76 (21 Apr 2024 15:42) (106/84 - 158/90)  BP(mean): --  ABP: --  ABP(mean): --  RR: 17 (21 Apr 2024 15:42) (17 - 20)  SpO2: 95% (21 Apr 2024 15:42) (95% - 97%)    O2 Parameters below as of 21 Apr 2024 14:10  Patient On (Oxygen Delivery Method): room air    Gen: NAD, malnourished, cachectic, hard of hearing  heent: perrla,eomi  Neck : supple  CVS: S1, S2  Lungs: CTA b/l  abd: soft, n/t, BS +  extr: no edema  Neuro: AAOx2, confused, normal speech, forgetful

## 2024-04-21 NOTE — ED PROVIDER NOTE - PROGRESS NOTE DETAILS
Hallman DO: pt signed out to me pending wound re-eval, pt with left arm elbow tumor/solid mass like growth with overlying skin tear that had scant bloody drainage but overall no significant bleeding - surgicel placed over skin tear and gauze applied, H&H stable, pt expressed desire to go home if stable, will monitor, pt required haldol at 630AM due to agitation and refusal of care but now much more amenable to treatment Hallman DO: wound assessed, no signs of bleeding, gauze clean, pt feels better, stable for dc but pt states she does not have keys to apt, she states she otherwise feels safe at home and has her walker and otherwise has been doing well on her own and desire is to go home - I spoke w/ SW for assistance to determine how to get pt back safely. Hallman DO: wound dressing changed once for scant bleeding but resolved, dressing still clean, attempted to discharge pt for home as she initially felt well but now she is concerned about generalized weakness, rebleed of wound, lives alone and uses walker, she feels too weak to walk adequately independently, hemodynamics stable, d/w pts son in [place] - not safe for discharge, will admit here, pt agreeable to plan, pt requesting her xanax - checked Istop and confirmed dose I stop Reference #: [id number]. pts son called stating that pt was telling him she had pain, I reassessed pt and she denies pain - declined tylenol

## 2024-04-21 NOTE — ED ADULT NURSE REASSESSMENT NOTE - NS ED NURSE REASSESS COMMENT FT1
RN and MD Hallman attended to pt, pt wound redressed and bandaged appropriately by MD Hallman. RN assisted pt into new gown and disposed of bloody clothing, pt linen changed and new diaper placed. pt placed on purewick device. safety maintained.

## 2024-04-21 NOTE — ED ADULT NURSE REASSESSMENT NOTE - NS ED NURSE REASSESS COMMENT FT1
Assisting primary RN Robles.  Pt agitated, trying to pull out IV, unable to redirect.  Pt medicated with 5mg Haldol IV as per MD order. Assisting primary RN Robles.  Pt agitated, trying to pull out IV, unable to redirect and at risk for patient safety.  Pt medicated with 5mg Haldol IV as per MD order.  Plan of care ongoing.

## 2024-04-21 NOTE — PATIENT PROFILE ADULT - IS THERE A SUSPICION OF ABUSE/NEGLIGENCE?
PRE-SEDATION ASSESSMENT    CONSENT  Consent for procedure and sedation obtained: Yes    MEDICAL HISTORY  Significant medical/surgical history: Yes  Past Complications with Sedation/Anesthesia: No  Significant Family History: No  Smoking History: No  Alcohol/Drug abuse: No  Possible Pregnancy (LMP): Not Applicable  Cardiac History: No  Respiratory History: No    PHYSICAL EXAM  History and Physical Reviewed: Patient has valid H&P within 30 days. I have reviewed and there are no changes.  Airway Anatomy : Class II  Heart : Normal  Lungs : Normal  LOC/Mental Status : Normal    OTHER FINDINGS  Reviewed current medications and allergies: Yes  Pertinent lab/diagnostic test reviewed: Yes    SEDATION RISK ASSESSMENT  Risk Status ASA: Class II - Normal patient with mild systemic disease  Plan for Sedation: Moderate Sedation  Indications for Procedure/Pre-Procedure Diagnosis and Planned Procedure: anxiety  EKG Monitoring: Yes    NARRATIVE FINDINGS      no

## 2024-04-21 NOTE — H&P ADULT - ASSESSMENT
89 y/o F w/ PMH fo RA, CAD, CHF, HTN , b/l elbow soft tissue swelling/ chronic swelling likely chronic Bursitis sec to RA (pt denies being on any tx for RA)  and has swan neck deformities of fingers b/l p/w c/o superficial laceration w/ bleeding from Lt elbow swelling  s/p pressure dressing in ED bleeding stopped      Bleeding from Lt elbow soft tissue swelling  s/p pressure dressing  daily dressing  Hold  mg qd which is on her home meds to prevent further bleed  Monitor Hb    HTN  c/w home meds with holding paarmeters, may need to cut down on anti htnves as she is very malnourished, pt is unsure which meds she is taking at home    RA w/ joint deformities- supportive care- pain control prn- not on any tx for RA per pt    Social issues- lives alone, unsafe dc for home, son in [place]- will get PT eval and SW eval for placement to SNF.    ICD for dvt ppx given recent bleed from Lt elbow swelling  Fall precautions

## 2024-04-21 NOTE — ED ADULT TRIAGE NOTE - CHIEF COMPLAINT QUOTE
bibems for left elbow bleeding from a softball size tumor/growth on elbow.  Per Pt, she has had this growth for a long time.  Denies any blood thinner use, or pain.  Elbow wrapped by EMS, bleeding controlled.  Pt lives by herself, Aox4.  fs by ems 112  hx of HTN.  nkda.

## 2024-04-21 NOTE — ED ADULT NURSE REASSESSMENT NOTE - NS ED NURSE REASSESS COMMENT FT1
pt requesting "dry muffin" and food. RN called kitchen and placed order for pt to receive bkfst tray. Awaiting update from SW with regards to how pt is to get into home being that she doesn't have her keys on her at this time. MD Hallman informed RN that SW would be working on this matter. pt calm and demonstrates no s/s of distress at this time. wound dressed and dressing appears dry and clean. safety maintained.

## 2024-04-21 NOTE — ED PROVIDER NOTE - CLINICAL SUMMARY MEDICAL DECISION MAKING FREE TEXT BOX
elderly female w/ RA, CHF presenting to the ED for bleeding from L elbow growth    wound wrapped with resolution of bleeding  will obtain XR and reassess bleeding

## 2024-04-21 NOTE — ED ADULT NURSE NOTE - OBJECTIVE STATEMENT
Covering for primary RN, Robles. Pt AAOx4, 89 year old female presents to ED with complaint left elbow bleeding from a softball size tumor/growth on elbow.  Per Pt, she has had this growth for a long time.  Denies any blood thinner use, or pain.  Elbow wrapped by EMS, bleeding controlled.  Pt lives by herself. Respirations equal and unlabored. No acute distress noted at this time.

## 2024-04-21 NOTE — ED PROVIDER NOTE - OBJECTIVE STATEMENT
89 F pmh rheumatoid arthritis, CHF presenting to the ED w/ complaints of bleeding from her L elbow. Pt states that her elbow tumor started bleeding in her house causing a mess.

## 2024-04-21 NOTE — ED ADULT NURSE REASSESSMENT NOTE - NS ED NURSE REASSESS COMMENT FT1
Dr Colunga at bedside to evaluate wound. Unwrapped gauze placed by EMS, wound currently bleeding. Dr Colunga applied pressure bandage to wound. Labs sent. Respirations equal and unlabored. No acute distress noted at this time.

## 2024-04-21 NOTE — ED ADULT NURSE NOTE - NSFALLHARMRISKINTERV_ED_ALL_ED

## 2024-04-21 NOTE — ED PROVIDER NOTE - PHYSICAL EXAMINATION
General: Well appearing elderly female in no acute distress  HEENT: Normocephalic, atraumatic. Moist mucous membranes. Oropharynx clear. No lymphadenopathy.  Eyes: No scleral icterus. EOMI. BRYAN.  Neck:. Soft and supple. Full ROM without pain. No midline tenderness  Cardiac: Regular rate and regular rhythm. Peripheral pulses 2+ and symmetric. No LE edema.  Resp: Lungs CTAB. Speaking in full sentences. No wheezes, rales or rhonchi.  Abd: Soft, non-tender, non-distended. No guarding or rebound.  MSK: b/l elbow bony growths with L elbow bleeding  Back: Spine midline and non-tender. No CVA tenderness.    Skin: skin tear over L elbow with blood filled sac around growth  Neuro: AO x 3. Moves all extremities symmetrically. Motor strength and sensation grossly intact.

## 2024-04-21 NOTE — PATIENT PROFILE ADULT - FALL HARM RISK - HARM RISK INTERVENTIONS

## 2024-04-22 LAB
ANION GAP SERPL CALC-SCNC: 10 MMOL/L — SIGNIFICANT CHANGE UP (ref 5–17)
BUN SERPL-MCNC: 15 MG/DL — SIGNIFICANT CHANGE UP (ref 7–23)
CALCIUM SERPL-MCNC: 8.6 MG/DL — SIGNIFICANT CHANGE UP (ref 8.5–10.1)
CHLORIDE SERPL-SCNC: 102 MMOL/L — SIGNIFICANT CHANGE UP (ref 96–108)
CO2 SERPL-SCNC: 25 MMOL/L — SIGNIFICANT CHANGE UP (ref 22–31)
CREAT SERPL-MCNC: 0.54 MG/DL — SIGNIFICANT CHANGE UP (ref 0.5–1.3)
EGFR: 86 ML/MIN/1.73M2 — SIGNIFICANT CHANGE UP
GLUCOSE SERPL-MCNC: 94 MG/DL — SIGNIFICANT CHANGE UP (ref 70–99)
HCT VFR BLD CALC: 40.7 % — SIGNIFICANT CHANGE UP (ref 34.5–45)
HGB BLD-MCNC: 13.7 G/DL — SIGNIFICANT CHANGE UP (ref 11.5–15.5)
MCHC RBC-ENTMCNC: 29.9 PG — SIGNIFICANT CHANGE UP (ref 27–34)
MCHC RBC-ENTMCNC: 33.7 G/DL — SIGNIFICANT CHANGE UP (ref 32–36)
MCV RBC AUTO: 88.9 FL — SIGNIFICANT CHANGE UP (ref 80–100)
NRBC # BLD: 0 /100 WBCS — SIGNIFICANT CHANGE UP (ref 0–0)
PLATELET # BLD AUTO: 168 K/UL — SIGNIFICANT CHANGE UP (ref 150–400)
POTASSIUM SERPL-MCNC: 3.2 MMOL/L — LOW (ref 3.5–5.3)
POTASSIUM SERPL-SCNC: 3.2 MMOL/L — LOW (ref 3.5–5.3)
RBC # BLD: 4.58 M/UL — SIGNIFICANT CHANGE UP (ref 3.8–5.2)
RBC # FLD: 14.3 % — SIGNIFICANT CHANGE UP (ref 10.3–14.5)
SODIUM SERPL-SCNC: 137 MMOL/L — SIGNIFICANT CHANGE UP (ref 135–145)
WBC # BLD: 7.02 K/UL — SIGNIFICANT CHANGE UP (ref 3.8–10.5)
WBC # FLD AUTO: 7.02 K/UL — SIGNIFICANT CHANGE UP (ref 3.8–10.5)

## 2024-04-22 PROCEDURE — 99232 SBSQ HOSP IP/OBS MODERATE 35: CPT

## 2024-04-22 PROCEDURE — 99222 1ST HOSP IP/OBS MODERATE 55: CPT

## 2024-04-22 RX ORDER — HALOPERIDOL DECANOATE 100 MG/ML
1 INJECTION INTRAMUSCULAR ONCE
Refills: 0 | Status: DISCONTINUED | OUTPATIENT
Start: 2024-04-22 | End: 2024-04-22

## 2024-04-22 RX ORDER — ALPRAZOLAM 0.25 MG
0.25 TABLET ORAL
Refills: 0 | Status: DISCONTINUED | OUTPATIENT
Start: 2024-04-22 | End: 2024-04-24

## 2024-04-22 RX ORDER — HALOPERIDOL DECANOATE 100 MG/ML
1 INJECTION INTRAMUSCULAR ONCE
Refills: 0 | Status: COMPLETED | OUTPATIENT
Start: 2024-04-22 | End: 2024-04-22

## 2024-04-22 RX ORDER — POTASSIUM CHLORIDE 20 MEQ
20 PACKET (EA) ORAL
Refills: 0 | Status: COMPLETED | OUTPATIENT
Start: 2024-04-22 | End: 2024-04-22

## 2024-04-22 RX ADMIN — HALOPERIDOL DECANOATE 1 MILLIGRAM(S): 100 INJECTION INTRAMUSCULAR at 21:50

## 2024-04-22 RX ADMIN — Medication 1 TABLET(S): at 11:59

## 2024-04-22 RX ADMIN — Medication 50 MILLIGRAM(S): at 06:12

## 2024-04-22 RX ADMIN — Medication 1000 UNIT(S): at 11:59

## 2024-04-22 RX ADMIN — Medication 500 MILLIGRAM(S): at 11:59

## 2024-04-22 RX ADMIN — Medication 20 MILLIEQUIVALENT(S): at 12:02

## 2024-04-22 RX ADMIN — ATORVASTATIN CALCIUM 20 MILLIGRAM(S): 80 TABLET, FILM COATED ORAL at 21:55

## 2024-04-22 RX ADMIN — SERTRALINE 25 MILLIGRAM(S): 25 TABLET, FILM COATED ORAL at 11:59

## 2024-04-22 NOTE — BH CONSULTATION LIAISON ASSESSMENT NOTE - RISK ASSESSMENT
see above - not able to safely meet her basic needs in her current state and unassisted environment.

## 2024-04-22 NOTE — BH CONSULTATION LIAISON ASSESSMENT NOTE - NSBHCONSULTFOLLOWAFTERCARE_PSY_A_CORE FT
as per primary team  as per primary team; needs multiple types of resources/services/safe home setting

## 2024-04-22 NOTE — BH CONSULTATION LIAISON ASSESSMENT NOTE - NSSUICPROTFACT_PSY_ALL_CORE
Responsibility to children, family, or others/Identifies reasons for living/Supportive social network of family or friends/Fear of death or the actual act of killing self/Cultural, spiritual and/or moral attitudes against suicide/Congregation beliefs

## 2024-04-22 NOTE — BH CONSULTATION LIAISON ASSESSMENT NOTE - SUMMARY
89 year old female  presenting very frail, underweight, skeletal appearance, left elbow with an orange sized wrapped protrusion, ++ severe ulnar deviation of bilateral hands to the point fingers looked fused together with no space in between them and with obvious limited ability to grasp/hold things (highly likely NOT ABLE to meal prep like cutting, hold a hot beverage cup safely, manage fine motor skills such as buttoning her shirt etc). Patient overall looks unkempt. Berta to carry a conversation BUT with episodes of recall/forgetfulness/irritability consistent with age-related cognitive decline. +++ self reported concerning living conditions in the home including a non-working fridge, alluded to hx of having issues with heat as well.  Patient does not seem to know how to utilize phone/online deliveries at all and mentioned "Germain" bringing her groceries at times. GIVEN PHYSICAL APPEARANCE FOOD INSECURITY /LACK OF ACCESS TO ADEQUATE DAILY NUTRITION IS HIGHLY LIKELY.

## 2024-04-22 NOTE — BH CONSULTATION LIAISON ASSESSMENT NOTE - NSBHCHARTREVIEWLAB_PSY_A_CORE FT
04-22    137  |  102  |  15  ----------------------------<  94  3.2<L>   |  25  |  0.54    Ca    8.6      22 Apr 2024 07:20    TPro  8.0  /  Alb  3.4  /  TBili  0.7  /  DBili  x   /  AST  28  /  ALT  18  /  AlkPhos  58  04-21

## 2024-04-22 NOTE — PHYSICAL THERAPY INITIAL EVALUATION ADULT - GENERAL OBSERVATIONS, REHAB EVAL
emr reviewed and events noted to date. Pt encountered semi-reclined, alert and oriented x1, dressing to L elbow in place, NAD. Pt for both wound care and functional assessment at this time.

## 2024-04-22 NOTE — BH CONSULTATION LIAISON ASSESSMENT NOTE - NSBHCHARTREVIEWVS_PSY_A_CORE FT
Vital Signs Last 24 Hrs  T(C): 36.7 (22 Apr 2024 11:29), Max: 36.7 (21 Apr 2024 21:15)  T(F): 98 (22 Apr 2024 11:29), Max: 98 (21 Apr 2024 21:15)  HR: 90 (22 Apr 2024 15:00) (82 - 90)  BP: 158/101 (22 Apr 2024 15:00) (118/67 - 166/84)  BP(mean): --  RR: 18 (22 Apr 2024 15:00) (16 - 18)  SpO2: 98% (22 Apr 2024 15:00) (95% - 98%)    Parameters below as of 22 Apr 2024 15:00  Patient On (Oxygen Delivery Method): room air

## 2024-04-22 NOTE — BH CONSULTATION LIAISON ASSESSMENT NOTE - NSBHCHARTREVIEWINVESTIGATE_PSY_A_CORE FT
Xray Elbow AP + Lateral, Bilateral (04.21.24 @ 04:43) No elbow fracture. Large bulbous rounded soft tissue, 8.5 cm at the left olecranon. Round soft tissue density adjacent to the right olecranon, measuring 5.4 cm. 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

## 2024-04-22 NOTE — PHYSICAL THERAPY INITIAL EVALUATION ADULT - PERTINENT HX OF CURRENT PROBLEM, REHAB EVAL
89 yo female w/ pmhx of ra w/ swan neck deformities of the hands , cad, CHF, HTN, bilateral elbow tophi admitted to F for laceration of the left elbow

## 2024-04-22 NOTE — PHYSICAL THERAPY INITIAL EVALUATION ADULT - FOLLOWS COMMANDS/ANSWERS QUESTIONS, REHAB EVAL
constant verbal cueing and manual guidance required/50% of the time/able to follow multistep instructions/able to follow single-step instructions

## 2024-04-22 NOTE — PHYSICAL THERAPY INITIAL EVALUATION ADULT - SITTING BALANCE: STATIC
Office Note, Pulmonary  CHIEF COMPLAINT:  Chief Complaint   Patient presents with   • Office Visit           Visit Vitals  /72 (BP Location: LUE - Left upper extremity, Patient Position: Sitting, Cuff Size: Regular)   Pulse 88   Resp 16   Ht 5' 5\" (1.651 m)   Wt 93.9 kg (207 lb)   SpO2 94% Comment: ra   BMI 34.45 kg/m²         HPI:  She presents for follow up on ILD  Improvement noted on her PFTs today CW last visit. She had a respiratory infection prior to last test  Feels well, except limited from ambulation by back discomfort   Patient reports mild chronic productive cough without a recent URI or increased respiratory symptoms.  No chest pain, weight loss or hemoptysis.     Current Outpatient Medications   Medication Sig Dispense Refill   • gabapentin (NEURONTIN) 400 MG capsule Take 400 mg by mouth.     • rOPINIRole (REQUIP) 0.5 MG tablet      • lidocaine (LIDOCARE) 4 % patch Place 1 patch onto the skin every 24 hours. 10 patch 0   • tiZANidine (ZANAFLEX) 2 MG tablet Take 1 tablet by mouth every 6 hours as needed for Muscle spasms. 30 tablet 0   • benzonatate (TESSALON PERLES) 200 MG capsule Take 200 mg by mouth as needed.     • cetirizine (ZyrTEC) 5 MG tablet Take 5 mg by mouth daily.     • fluticasone (FLONASE) 50 MCG/ACT nasal spray Spray 1 spray in each nostril daily.     • oxygen (O2) gas 11/2020 Rx given per Pulmonology for pulmonary fibrosis.     • Polyethylene Glycol 3350 (MIRALAX PO) Take by mouth as needed.     • omeprazole (PriLOSEC) 20 MG capsule Take 1 capsule by mouth daily. 90 capsule 3   • Multiple Vitamins-Minerals (OCUVITE ADULT 50+ PO) Take by mouth daily.     • turmeric 500 MG capsule Take by mouth daily.     • furosemide (LASIX) 20 MG tablet Take 2 tablets (40 mg) daily for 2 days 9/29/20 and 9/30/20 then as needed for weight gain of 3 pounds in a day or 5 pounds over usual baseline (Patient taking differently: Take 1 tablet by mouth 3 days per week. (Mon, Weds, Fri) Also take as  neededfor weight gain of 3 pounds in a day or 5 pounds over usual baseline) 20 tablet 0   • potassium chloride (KLOR-CON SPRINKLE) 10 MEQ ER capsule Take 2 tablets (20 MEQ) 9/29/20 and 9/30/20 and then as needed only on days you take furosemide (Lasix) as advised for weight gain. 20 capsule 0   • amLODIPine (NORVASC) 5 MG tablet Take 1 tablet by mouth daily. Do not take if SBP <110     • atorvastatin (LIPITOR) 80 MG tablet Take 80 mg by mouth nightly.     • Ferrous Sulfate (IRON PO) Take 160 mg by mouth daily.      • acetaminophen (TYLENOL) 325 MG tablet Take 325 mg by mouth every 4 hours as needed for Pain.      • Lipoic Acid 150 MG Cap Take 150 mg by mouth daily.     • amoxicillin (AMOXIL) 500 MG capsule Take 4 capsules by mouth as needed (for dental procedure).      • fluticasone-salmeterol (ADVAIR HFA) 115-21 MCG/ACT inhaler Inhale 2 puffs into the lungs 2 times daily as needed.     • magnesium oxide (MAG-OX) 400 MG tablet Take 400 mg by mouth daily.     • Misc Natural Products (OSTEO BI-FLEX ADV TRIPLE ST) Tab Take 1 capsule by mouth daily.     • Multiple Vitamin (MULTI-VITAMINS) Tab Take 1 tablet by mouth daily.     • albuterol 108 (90 Base) MCG/ACT inhaler Inhale 2 puffs into the lungs every 4 hours as needed for Shortness of Breath or Wheezing. 1 Inhaler 5   • vitamin B-12 (CYANOCOBALAMIN) 1000 MCG tablet Take 1,000 mcg by mouth daily.     • aspirin 81 MG tablet Take 81 mg by mouth daily.     • Coenzyme Q-10 200 MG Cap Take 1 tablet by mouth daily.      • Omega-3 Fatty Acids (FISH OIL) 1000 MG capsule Take 1 g by mouth daily.      • nitroGLYcerin (NITROSTAT) 0.4 MG SL tablet Place 0.4 mg under the tongue every 5 minutes as needed.     • Cholecalciferol 25 mcg (1,000 units) capsule Take 2,000 Units by mouth daily.      • gabapentin (NEURONTIN) 100 MG capsule Take 5 tablets (=500mg) at supper time (5pm) and take 5 tablets (=500mg) at bedtime       No current facility-administered medications for this visit.        I have reviewed the patient's medications and allergies, past medical, surgical, social and family history,   updating these as appropriate.  See Histories section of the EMR for a display of this information.    Social History     Tobacco Use   Smoking Status Never   Smokeless Tobacco Never       REVIEW OF SYSTEMS:  Otherwise unchanged      PHYSICAL EXAM:  Constitutional: ambulatory, no acute distress, non-toxic appearance.   Face mask on  Respiratory:  Respiratory effort normal, breath sounds mod.  + rales ,no rhonchi, no wheezing.  Cardiovascular:  Normal rate, normal rhythm, no murmur, no gallops, no rubs.   Musculoskeletal:  no edema, no tenderness, no deformities.  Exam otherwise deferred   Psychiatric:  Speech and behavior appropriate.        ASSESSMENT:  1. ILD (interstitial lung disease) (CMS/HCC)    2. RICHY (obstructive sleep apnea) , using and benefiting from CPAP       PLAN:  Orders Placed This Encounter   • SERVICE TO HOME CARE RESPIRATORY THERAPY   • PFT     Return in about 1 year (around 12/13/2023), or if symptoms worsen or fail to improve, for Interstitial Lung Disease.      warning signs of exacerbations were discussed.    Electronically signed   Eula Stafford MD  12/14/22     poor balance

## 2024-04-22 NOTE — BH CONSULTATION LIAISON ASSESSMENT NOTE - CURRENT MEDICATION
MEDICATIONS  (STANDING):  ascorbic acid 500 milliGRAM(s) Oral daily  atorvastatin 20 milliGRAM(s) Oral at bedtime  cholecalciferol 1000 Unit(s) Oral daily  metoprolol succinate ER 50 milliGRAM(s) Oral daily  multivitamin/minerals 1 Tablet(s) Oral daily  potassium chloride    Tablet ER 20 milliEquivalent(s) Oral every 2 hours  sertraline 25 milliGRAM(s) Oral daily    MEDICATIONS  (PRN):  acetaminophen     Tablet .. 650 milliGRAM(s) Oral every 6 hours PRN Temp greater or equal to 38C (100.4F), Mild Pain (1 - 3)  aluminum hydroxide/magnesium hydroxide/simethicone Suspension 30 milliLiter(s) Oral every 4 hours PRN Dyspepsia  melatonin 3 milliGRAM(s) Oral at bedtime PRN Insomnia  ondansetron Injectable 4 milliGRAM(s) IV Push every 8 hours PRN Nausea and/or Vomiting

## 2024-04-22 NOTE — BH CONSULTATION LIAISON ASSESSMENT NOTE - HPI (INCLUDE ILLNESS QUALITY, SEVERITY, DURATION, TIMING, CONTEXT, MODIFYING FACTORS, ASSOCIATED SIGNS AND SYMPTOMS)
89 yo F, , single, noncaregiver, has adult son, lives alone in senior apartment complex, was approved for Medicaid but did not want to pay the spendown and declined HHA services, last VS admission with concern for unsafe living conditions - Case Coordination note from 5/24/2023 "Physical therapy recommended [facility] but pt. declined and prefers to return home with home care services. Pt. does state that she manages fine at home. Pt. states there is food in the house but has drinking water. [place] were provided to pt. to take home. Pt. was also with a Meals on Wheels application."  Presented to the ED w/ complaints of bleeding from her L elbow. Pt states that her elbow tumor started bleeding in her house causing a mess. 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  Per Pt, she has had this growth for a long time on both elbows, likely sec to RA. Hospital course significant for episodes of agitation requiring STAT medications and episodes of forgetfulness. Consult called for capacity to refuse going to [facility].     ISTOP  89 yo F, , single, noncaregiver, has adult son, lives alone in senior apartment complex, was approved for Medicaid but did not want to pay the spendown and declined HHA services, last VS admission with concern for unsafe living conditions - Case Coordination note from 5/24/2023 "Physical therapy recommended [facility] but pt. declined and prefers to return home with home care services. Pt. does state that she manages fine at home. Pt. states there is food in the house but has drinking water. [place] were provided to pt. to take home. Pt. was also with a Meals on Wheels application."  Presented to the ED w/ complaints of bleeding from her L elbow. Pt states that her elbow tumor started bleeding in her house causing a mess. 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  Per Pt, she has had this growth for a long time on both elbows, likely sec to RA. Hospital course significant for episodes of agitation requiring STAT medications and episodes of forgetfulness. Consult called for capacity to refuse going to [facility].     ISTOP reference #[id number]  4/2/2024 alprazolam 0.25mg 120 tabs for 30 days by Dr Jenn Levi (but fills it q2 monthly so dose 0.25mg BID)   - same RX in 2/14/24, 12/8/23, 10/9/23, 8/15/23, 6/30/23 89 yo  F, , single, noncaregiver, reports she had 3 sons (2 , 3rd lives in a [facility] community, last saw in person 2 years ago), lives alone in senior apartment complex, reports she has an acquaintance named Shantanu who is a retired police officers and whom she met while he was delivering medications to others in the senior complex who helps her out when she needs, was approved for Medicaid but did not want to pay the spendown and declined HHA services, last VS admission with concern for unsafe living conditions - Case Coordination note from 2023 "Physical therapy recommended [facility] but pt. declined and prefers to return home with home care services. Pt. does state that she manages fine at home. Pt. states there is food in the house but has drinking water. [place] were provided to pt. to take home. Pt. was also with a Meals on Wheels application."  Presented to the ED w/ complaints of bleeding from her L elbow. Pt states that her elbow tumor started bleeding in her house causing a mess. 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  Per Pt, she has had this growth for a long time on both elbows, likely sec to RA. Hospital course significant for episodes of agitation requiring STAT medications and episodes of forgetfulness. Consult called for capacity to refuse going to [facility].     EXAM: very frail, underweight, skeletal appearance, left elbow with an orange sized wrapped protrusion, right elbow also with a smaller ~ 2" protrusion, +++ severe ulnar deviation of bilateral hands to the point fingers looked fused together with no space in between them and with obvious limited ability to grasp/hold things. Patient overall looks unkempt. Calm, cooperative, engages, able to maintain a general linear thought process, is a reliable historian but with episodes of recall/forgetfulness/irritability consistent with age-related cognitive decline. Patient reports that her senior housing had issues with heat, for the last 1 year her refrigerator froze all of her food she could not prepare (supposedly getting a new fridge this Friday?), NOT ABLE to explain how she gets her groceries and how she meal preps. Patient does not seem to know how to utilize phone/online deliveries at all and mentioned "Germain" bringing her groceries at times. Patient also cannot explain how her place gets cleaned, maintained and so on.  With questions regarding specific ADLs and everyday living activities, resources, Patient gets flustered and evasive. No associated mood sxs manifested or reported, no psychotic sxs, no evidence of suicidality.     ISTOP reference #[id number]  2024 alprazolam 0.25mg 120 tabs for 30 days by Dr Jenn Levi (but fills it q2 monthly so dose 0.25mg BID)   - same RX in 24, 23, 10/9/23, 8/15/23, 23

## 2024-04-22 NOTE — PHYSICAL THERAPY INITIAL EVALUATION ADULT - ACTIVE RANGE OF MOTION EXAMINATION, REHAB EVAL
limited elbow extension B actively secondary to joint deformity secondary to RA/bilateral upper extremity Active ROM was WFL (within functional limits)/bilateral  lower extremity Active ROM was WFL (within functional limits)/deficits as listed below

## 2024-04-22 NOTE — BH CONSULTATION LIAISON ASSESSMENT NOTE - MSE UNSTRUCTURED FT
very frail, underweight, skeletal appearance, left elbow with an orange sized wrapped protrusion, right elbow also with a smaller ~ 2" protrusion, +++ severe ulnar deviation of bilateral hands to the point fingers looked fused together with no space in between them and with obvious limited ability to grasp/hold things. Patient overall looks unkempt. Calm, cooperative, engages, able to maintain a general linear thought process, is a reliable historian but with episodes of recall/forgetfulness/irritability consistent with age-related cognitive decline. Patient gets flustered and evasive when asked for details about how she manages everyday life activities. No associated mood sxs manifested or reported, no psychotic sxs, no evidence of suicidality.

## 2024-04-22 NOTE — PHYSICAL THERAPY INITIAL EVALUATION ADULT - ADDITIONAL COMMENTS
pt lives alone in an apartment, uses a rolling walker. Information was gathered from call that social work had with son who lives in [place].

## 2024-04-22 NOTE — BH CONSULTATION LIAISON ASSESSMENT NOTE - NSBHATTESTBILLING_PSY_A_CORE
60052-Bibpnahfoj - moderate complexity OR 30-39 mins 99222-Initial OBS or IP - moderate complexity OR 55-74 mins

## 2024-04-22 NOTE — BH CONSULTATION LIAISON ASSESSMENT NOTE - NSBHCONSULTRECOMMENDOTHER_PSY_A_CORE FT
- malnutrition work up panel ordered   - CANNOT leave AMA  - serious safety concern given findings (see "summary" section above)

## 2024-04-23 DIAGNOSIS — E43 UNSPECIFIED SEVERE PROTEIN-CALORIE MALNUTRITION: ICD-10-CM

## 2024-04-23 LAB
ANION GAP SERPL CALC-SCNC: 13 MMOL/L — SIGNIFICANT CHANGE UP (ref 5–17)
BUN SERPL-MCNC: 26 MG/DL — HIGH (ref 7–23)
CALCIUM SERPL-MCNC: 9.1 MG/DL — SIGNIFICANT CHANGE UP (ref 8.5–10.1)
CHLORIDE SERPL-SCNC: 103 MMOL/L — SIGNIFICANT CHANGE UP (ref 96–108)
CHOLEST SERPL-MCNC: 156 MG/DL — SIGNIFICANT CHANGE UP
CO2 SERPL-SCNC: 22 MMOL/L — SIGNIFICANT CHANGE UP (ref 22–31)
CREAT SERPL-MCNC: 1.17 MG/DL — SIGNIFICANT CHANGE UP (ref 0.5–1.3)
CRP SERPL-MCNC: 42 MG/L — HIGH
EGFR: 44 ML/MIN/1.73M2 — LOW
FOLATE SERPL-MCNC: 14.3 NG/ML — SIGNIFICANT CHANGE UP
GLUCOSE SERPL-MCNC: 98 MG/DL — SIGNIFICANT CHANGE UP (ref 70–99)
HDLC SERPL-MCNC: 66 MG/DL — SIGNIFICANT CHANGE UP
IRON SATN MFR SERPL: 14 % — SIGNIFICANT CHANGE UP (ref 14–50)
IRON SATN MFR SERPL: 42 UG/DL — SIGNIFICANT CHANGE UP (ref 30–160)
LIPID PNL WITH DIRECT LDL SERPL: 79 MG/DL — SIGNIFICANT CHANGE UP
MAGNESIUM SERPL-MCNC: 2 MG/DL — SIGNIFICANT CHANGE UP (ref 1.6–2.6)
NON HDL CHOLESTEROL: 90 MG/DL — SIGNIFICANT CHANGE UP
PHOSPHATE SERPL-MCNC: 3.5 MG/DL — SIGNIFICANT CHANGE UP (ref 2.5–4.5)
POTASSIUM SERPL-MCNC: 4 MMOL/L — SIGNIFICANT CHANGE UP (ref 3.5–5.3)
POTASSIUM SERPL-SCNC: 4 MMOL/L — SIGNIFICANT CHANGE UP (ref 3.5–5.3)
PREALB SERPL-MCNC: 9 MG/DL — LOW (ref 20–40)
SODIUM SERPL-SCNC: 138 MMOL/L — SIGNIFICANT CHANGE UP (ref 135–145)
TIBC SERPL-MCNC: 294 UG/DL — SIGNIFICANT CHANGE UP (ref 220–430)
TRANSFERRIN SERPL-MCNC: 179 MG/DL — LOW (ref 200–360)
TRIGL SERPL-MCNC: 53 MG/DL — SIGNIFICANT CHANGE UP
UIBC SERPL-MCNC: 253 UG/DL — SIGNIFICANT CHANGE UP (ref 110–370)
VIT B12 SERPL-MCNC: 399 PG/ML — SIGNIFICANT CHANGE UP (ref 232–1245)

## 2024-04-23 PROCEDURE — 99231 SBSQ HOSP IP/OBS SF/LOW 25: CPT

## 2024-04-23 PROCEDURE — 99232 SBSQ HOSP IP/OBS MODERATE 35: CPT

## 2024-04-23 RX ADMIN — ATORVASTATIN CALCIUM 20 MILLIGRAM(S): 80 TABLET, FILM COATED ORAL at 22:07

## 2024-04-23 RX ADMIN — Medication 50 MILLIGRAM(S): at 05:19

## 2024-04-23 NOTE — DIETITIAN INITIAL EVALUATION ADULT - PERTINENT MEDS FT
MEDICATIONS  (STANDING):  ascorbic acid 500 milliGRAM(s) Oral daily  atorvastatin 20 milliGRAM(s) Oral at bedtime  cholecalciferol 1000 Unit(s) Oral daily  metoprolol succinate ER 50 milliGRAM(s) Oral daily  multivitamin/minerals 1 Tablet(s) Oral daily  sertraline 25 milliGRAM(s) Oral daily    MEDICATIONS  (PRN):  acetaminophen     Tablet .. 650 milliGRAM(s) Oral every 6 hours PRN Temp greater or equal to 38C (100.4F), Mild Pain (1 - 3)  ALPRAZolam 0.25 milliGRAM(s) Oral two times a day PRN anxiety  aluminum hydroxide/magnesium hydroxide/simethicone Suspension 30 milliLiter(s) Oral every 4 hours PRN Dyspepsia  melatonin 3 milliGRAM(s) Oral at bedtime PRN Insomnia  ondansetron Injectable 4 milliGRAM(s) IV Push every 8 hours PRN Nausea and/or Vomiting

## 2024-04-23 NOTE — BH CONSULTATION LIAISON PROGRESS NOTE - NSBHPTASSESSDT_PSY_A_CORE
Case Management Assessment    Patient name Laya Brooks  Location / MRN [id number]  : 1943 Date 3/25/2024       Current Admission Date: 3/23/2024  Current Admission Diagnosis:Chronic obstructive pulmonary disease with acute exacerbation (HCC)   Patient Active Problem List    Diagnosis Date Noted    SEEMA (acute kidney injury) (HCC) 2024    CHF (congestive heart failure) (HCC) 2024    Hyponatremia 2024    Chronic obstructive pulmonary disease with acute exacerbation (HCC) 2024    Tobacco use 2024    Wheezing 2024    S/P right coronary artery (RCA) stent placement 2024    Celiac artery stenosis (HCC) 2023    CAD (coronary artery disease) 08/10/2022    Weight loss, non-intentional 2022    Hypervitaminosis D 2022    Hypertension 2022    Intracranial atherosclerosis 2022    Atrophy of right kidney 2022    Stage 3a chronic kidney disease (HCC) 2022    Renal vascular disease 2022    Dyspnea on exertion 2022    Abnormal echocardiogram 2022    CVA (cerebral vascular accident) ([facility]) 2022    Thrombocytopenia ([facility]) 2022    Leukopenia 2022    Neutropenia ([facility]) 2022    Hyperphosphatemia 2022    Hypercholesterolemia 2022    Carotid artery stenosis 2022    Nocturnal hypoxia 2022    History of CVA (cerebrovascular accident) 2022    Hypertensive emergency 2022    Malnutrition ([facility]) 2022    Premature atrial complexes 2022    Dyslipidemia 2015      LOS (days): 2  Geometric Mean LOS (GMLOS) (days): 3  Days to GMLOS:1.5     OBJECTIVE:    Risk of Unplanned Readmission Score: 15.61         Current admission status: Inpatient       Preferred Pharmacy:   [id number] - [place] - [address] [place] [address] [address]  [address] [place] [address] [address], [address]  [facility] [address]  Phone: [phone] Fax: [phone]    [id number] - [place] -  [address] [facility]  [address] [facility] [address]  Phone: [phone] Fax: [phone]    [id number] - [place] - [address]  [address] [facility]  [place] [address]  Phone: [phone] Fax: [phone]    Primary Care Provider: Joana Shields,     Primary Insurance: MEDICARE  Secondary Insurance: AARP    ASSESSMENT:  Active Health Care Proxies       Moe Brooks Health Care Representative - Spouse   Primary Phone: [phone] (Home)                 Advance Directives  Does patient have a Health Care POA?: Yes  Does patient have Advance Directives?: Yes  Advance Directives: Living will, Power of  for health care (per spouse pt has both. pt spouse states he is POA. request for copy)  Primary Contact: Moe Brooks-spouse         Readmission Root Cause  30 Day Readmission: No    Patient Information  Admitted from:: Home  Mental Status: Alert (currently on bipap unable to speak with CM at this time)  During Assessment patient was accompanied by: Not accompanied during assessment  Assessment information provided by:: Spouse (spoke with spouse via phone)  Primary Caregiver: Self  Support Systems: Self, Spouse/significant other, Family members  County [place] Residence: [facility] [place]  What city do you live in[place] [facility] [place] [facility] [place])  Home entry access options. Select all that apply.: Stairs  Number of steps to enter home.: 2  Do the steps have railings?: No  Type of Current Residence: [place] (pt does have to go to basement to do laundry)  Living Arrangements: Lives w/ Spouse/significant other  Is patient a ?: No    Activities of Daily Living Prior to Admission  Functional Status: Independent  Completes ADLs independently?: Yes  Ambulates independently?: Yes  Does patient use assisted devices?: No  Does patient currently own DME?: Yes  What DME does the patient currently own?: Walker  Does patient have a history of Outpatient Therapy (PT/OT)?: No  Does the  patient have a history of Short-Term Rehab?: No  Does patient have a history of HHC?: No  Does patient currently have HHC?: No         Patient Information Continued  Income Source: SSI/SSD  Does patient have prescription coverage?: Yes  Does patient receive dialysis treatments?: No  Does patient have a history of substance abuse?: No  Does patient have a history of Mental Health Diagnosis?: No (spouse feels pt slightly depressed latjose due to health status)         Means of Transportation  Means of Transport to [facility]:: Family transport      Social Determinants of Health (SDOH)      Flowsheet Row Most Recent Value   Housing Stability    In the last 12 months, was there a time when you were not able to pay the mortgage or rent on time? N   In the last 12 months, how many places have you lived? 1   In the last 12 months, was there a time when you did not have a steady place to sleep or slept in a shelter (including now)? N   Transportation Needs    In the past 12 months, has lack of transportation kept you from medical appointments or from getting medications? no   In the past 12 months, has lack of transportation kept you from meetings, work, or from getting things needed for daily living? No   Food Insecurity    Within the past 12 months, you worried that your food would run out before you got the money to buy more. Never true   Within the past 12 months, the food you bought just didn't last and you didn't have money to get more. Never true   Utilities    In the past 12 months has the electric, gas, oil, or water company threatened to shut off services in your home? No        Cm met with the patient to evaluate the patients prior function and living situation and any barriers to d/c and form a safe d/c plan. Pt currently unable to talk due to bipap being on. Cm also evaluated the patient for any services in the home or needs for services. CM also discussed with patient that their preferences will be taken into  account/consideration.  Pt admitted with COPD, CHF, SEEMA. Cardiology and nephrology consulted. Pt currently needing bipap. Spoke with spouse via phone to obtain all information. CM to follow for all discharge needs.      23-Apr-2024 14:18

## 2024-04-23 NOTE — DIETITIAN INITIAL EVALUATION ADULT - OTHER INFO
Pt appears alert, however confused/disoriented; unable to obtain reliable information regarding diet and wt hx.  Per chart, pt lives alone in senior housing; no family nearby. Per son who lives in [place], pt was approved for Medicaid but did not want to pay the spend down and declined A services. Pt with severe RA, difficulty with food procurement and preparation. Per chart, pt for discharge to [facility] when medically cleared.  Pt with poor intake; consuming mostly 26-50% of documented meals during admission as per flow sheets. Amenable to Ensure Plus High Protein nutrition supplement; prefers chocolate flavor.  Pt on soft and bite sized consistency and appears to be tolerating/managing consistency without issue. No reports of any N/V/D/C.  Question accuracy of pt's admission wt, as she appears to weigh less than 48 kg/105.8 lbs. Per RD note from 05/2023, pt weighed 40.8 kg/89.9 lbs.

## 2024-04-23 NOTE — DIETITIAN NUTRITION RISK NOTIFICATION - TREATMENT: THE FOLLOWING DIET HAS BEEN RECOMMENDED
Diet, Soft and Bite Sized:   Supplement Feeding Modality:  Oral  Ensure Plus High Protein Cans or Servings Per Day:  1       Frequency:  Three Times a day (04-23-24 @ 11:05) [Pending Verification By Attending]  Diet, Soft and Bite Sized (04-21-24 @ 15:31) [Active]

## 2024-04-23 NOTE — BH CONSULTATION LIAISON PROGRESS NOTE - NSBHCONSULTRECOMMENDOTHER_PSY_A_CORE FT
4/22/24: malnutrition work up panel ordered   - CANNOT leave AMA  - serious safety concern given findings (see "summary" section above)  4/23/24: appreciate Nutrition consult; protein supplement shakes PO TID ordered   + staff meal assists and calorie count   - CANNOT leave AMA  - serious safety concern given findings (see "summary" section above)

## 2024-04-23 NOTE — DIETITIAN INITIAL EVALUATION ADULT - ADD RECOMMEND
1. Add low sodium to diet rx if PO intake improves  2. Provide encouragement and assistance with PO intake

## 2024-04-23 NOTE — BH CONSULTATION LIAISON PROGRESS NOTE - NSBHCHARTREVIEWVS_PSY_A_CORE FT
Vital Signs Last 24 Hrs  T(C): 36.4 (23 Apr 2024 11:02), Max: 36.7 (22 Apr 2024 16:54)  T(F): 97.6 (23 Apr 2024 11:02), Max: 98 (22 Apr 2024 16:54)  HR: 74 (23 Apr 2024 11:02) (74 - 95)  BP: 129/66 (23 Apr 2024 11:02) (129/66 - 161/102)  BP(mean): --  RR: 18 (23 Apr 2024 11:02) (17 - 18)  SpO2: 95% (23 Apr 2024 11:02) (94% - 98%)    Parameters below as of 23 Apr 2024 11:02  Patient On (Oxygen Delivery Method): room air

## 2024-04-23 NOTE — PROGRESS NOTE ADULT - SUBJECTIVE AND OBJECTIVE BOX
Patient is a 89y old  Female who presents with a chief complaint of BLEEDING FROM WOUND, GENERALIZED WEAKNESS     (23 Apr 2024 10:36)    INTERVAL HPI/OVERNIGHT EVENTS: confused and had to be sedated   SUBJECTIVE: no fever/chills.     MEDICATIONS  (STANDING):  ascorbic acid 500 milliGRAM(s) Oral daily  atorvastatin 20 milliGRAM(s) Oral at bedtime  cholecalciferol 1000 Unit(s) Oral daily  metoprolol succinate ER 50 milliGRAM(s) Oral daily  multivitamin/minerals 1 Tablet(s) Oral daily  sertraline 25 milliGRAM(s) Oral daily    MEDICATIONS  (PRN):  acetaminophen     Tablet .. 650 milliGRAM(s) Oral every 6 hours PRN Temp greater or equal to 38C (100.4F), Mild Pain (1 - 3)  ALPRAZolam 0.25 milliGRAM(s) Oral two times a day PRN anxiety  aluminum hydroxide/magnesium hydroxide/simethicone Suspension 30 milliLiter(s) Oral every 4 hours PRN Dyspepsia  melatonin 3 milliGRAM(s) Oral at bedtime PRN Insomnia  ondansetron Injectable 4 milliGRAM(s) IV Push every 8 hours PRN Nausea and/or Vomiting    Allergies    No Known Allergies    Intolerances      REVIEW OF SYSTEMS:  All other systems reviewed and are negative    Vital Signs Last 24 Hrs  T(C): 36.4 (23 Apr 2024 11:02), Max: 36.7 (22 Apr 2024 16:54)  T(F): 97.6 (23 Apr 2024 11:02), Max: 98 (22 Apr 2024 16:54)  HR: 74 (23 Apr 2024 11:02) (74 - 95)  BP: 129/66 (23 Apr 2024 11:02) (129/66 - 161/102)  BP(mean): --  RR: 18 (23 Apr 2024 11:02) (17 - 18)  SpO2: 95% (23 Apr 2024 11:02) (94% - 98%)    Parameters below as of 23 Apr 2024 11:02  Patient On (Oxygen Delivery Method): room air      Daily     Daily   I&O's Summary    22 Apr 2024 07:01  -  23 Apr 2024 07:00  --------------------------------------------------------  IN: 720 mL / OUT: 400 mL / NET: 320 mL      CAPILLARY BLOOD GLUCOSE        PHYSICAL EXAM:  GENERAL: NAD, well-groomed, well-developed. frail  HEAD:  Atraumatic, Normocephalic  NECK: Supple, No JVD, Normal thyroid  NERVOUS SYSTEM:  Alert & Oriented X4 w/ bouts of confusion  CHEST/LUNG: Clear to percussion bilaterally; No rales, rhonchi, wheezing, or rubs  HEART: Regular rate and rhythm; No murmurs, rubs, or gallops  ABDOMEN: Soft, Nontender, Nondistended; Bowel sounds present  EXTREMITIES: bandages of the left elbow. swan neck deformitities    Labs                          13.7   7.02  )-----------( 168      ( 22 Apr 2024 07:20 )             40.7     04-23    138  |  103  |  26<H>  ----------------------------<  98  4.0   |  22  |  1.17    Ca    9.1      23 Apr 2024 05:40  Phos  3.5     04-23  Mg     2.0     04-23            Urinalysis Basic - ( 23 Apr 2024 05:40 )    Color: x / Appearance: x / SG: x / pH: x  Gluc: 98 mg/dL / Ketone: x  / Bili: x / Urobili: x   Blood: x / Protein: x / Nitrite: x   Leuk Esterase: x / RBC: x / WBC x   Sq Epi: x / Non Sq Epi: x / Bacteria: x              
Patient is a 89y old  Female who presents with a chief complaint of bleeding from elbow soft tissue swelling (21 Apr 2024 15:31)    INTERVAL HPI/OVERNIGHT EVENTS: no fever/chills  SUBJECTIVE: no new complaints    MEDICATIONS  (STANDING):  ascorbic acid 500 milliGRAM(s) Oral daily  atorvastatin 20 milliGRAM(s) Oral at bedtime  cholecalciferol 1000 Unit(s) Oral daily  metoprolol succinate ER 50 milliGRAM(s) Oral daily  multivitamin/minerals 1 Tablet(s) Oral daily  potassium chloride    Tablet ER 20 milliEquivalent(s) Oral every 2 hours  sertraline 25 milliGRAM(s) Oral daily    MEDICATIONS  (PRN):  acetaminophen     Tablet .. 650 milliGRAM(s) Oral every 6 hours PRN Temp greater or equal to 38C (100.4F), Mild Pain (1 - 3)  aluminum hydroxide/magnesium hydroxide/simethicone Suspension 30 milliLiter(s) Oral every 4 hours PRN Dyspepsia  melatonin 3 milliGRAM(s) Oral at bedtime PRN Insomnia  ondansetron Injectable 4 milliGRAM(s) IV Push every 8 hours PRN Nausea and/or Vomiting    Allergies    No Known Allergies    Intolerances      REVIEW OF SYSTEMS:  All other systems reviewed and are negative    Vital Signs Last 24 Hrs  T(C): 36.7 (22 Apr 2024 11:29), Max: 36.7 (21 Apr 2024 21:15)  T(F): 98 (22 Apr 2024 11:29), Max: 98 (21 Apr 2024 21:15)  HR: 90 (22 Apr 2024 11:29) (82 - 90)  BP: 138/87 (22 Apr 2024 11:29) (118/67 - 166/84)  BP(mean): --  RR: 16 (22 Apr 2024 11:29) (16 - 18)  SpO2: 95% (22 Apr 2024 11:29) (95% - 97%)    Parameters below as of 22 Apr 2024 06:02  Patient On (Oxygen Delivery Method): room air      Daily     Daily   I&O's Summary    21 Apr 2024 07:01  -  22 Apr 2024 07:00  --------------------------------------------------------  IN: 0 mL / OUT: 150 mL / NET: -150 mL    22 Apr 2024 07:01  -  22 Apr 2024 15:57  --------------------------------------------------------  IN: 720 mL / OUT: 400 mL / NET: 320 mL      CAPILLARY BLOOD GLUCOSE        PHYSICAL EXAM:  GENERAL: NAD, well-groomed, well-developed  HEAD:  Atraumatic, Normocephalic  NECK: Supple, No JVD, Normal thyroid  NERVOUS SYSTEM:  Alert & Oriented X3. 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 tophi in the elbow    Labs                          13.7   7.02  )-----------( 168      ( 22 Apr 2024 07:20 )             40.7     04-22    137  |  102  |  15  ----------------------------<  94  3.2<L>   |  25  |  0.54    Ca    8.6      22 Apr 2024 07:20    TPro  8.0  /  Alb  3.4  /  TBili  0.7  /  DBili  x   /  AST  28  /  ALT  18  /  AlkPhos  58  04-21    PT/INR - ( 21 Apr 2024 04:05 )   PT: 12.6 sec;   INR: 1.06 ratio         PTT - ( 21 Apr 2024 04:05 )  PTT:37.4 sec      Urinalysis Basic - ( 22 Apr 2024 07:20 )    Color: x / Appearance: x / SG: x / pH: x  Gluc: 94 mg/dL / Ketone: x  / Bili: x / Urobili: x   Blood: x / Protein: x / Nitrite: x   Leuk Esterase: x / RBC: x / WBC x   Sq Epi: x / Non Sq Epi: x / Bacteria: x

## 2024-04-23 NOTE — PROGRESS NOTE ADULT - ASSESSMENT
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     PLAN  - c/w [facility]  - follow up w/ pt's son for goals of care

## 2024-04-23 NOTE — DIETITIAN INITIAL EVALUATION ADULT - PERTINENT LABORATORY DATA
04-23    138  |  103  |  26<H>  ----------------------------<  98  4.0   |  22  |  1.17    Ca    9.1      23 Apr 2024 05:40  Phos  3.5     04-23  Mg     2.0     04-23

## 2024-04-23 NOTE — BH CONSULTATION LIAISON PROGRESS NOTE - NSBHFUPINTERVALHXFT_PSY_A_CORE
Appreciate Nutrition consult - Severe protein-calorie malnutrition, Loss of subcutaneous fat...  Loss of muscle, recommended Ensure Plus High Protein Cans PO TID (ordered). Otherwise no significant interval events. Same psychiatric clinical presentation consistent with Patient's baseline.

## 2024-04-23 NOTE — DIETITIAN INITIAL EVALUATION ADULT - ETIOLOGY
Inadequate protein-energy intake related to difficulty with food procurement & preparation (severe RA, legally blind, financial restraints), CHF & CAD

## 2024-04-23 NOTE — PROGRESS NOTE ADULT - NUTRITIONAL ASSESSMENT
This patient has been assessed with a concern for Malnutrition and has been determined to have a diagnosis/diagnoses of Severe protein-calorie malnutrition.    This patient is being managed with:   Diet Soft and Bite Sized-  Supplement Feeding Modality:  Oral  Ensure Plus High Protein Cans or Servings Per Day:  1       Frequency:  Three Times a day  Entered: Apr 23 2024 11:05AM    Diet Soft and Bite Sized-  Entered: Apr 21 2024  3:30PM    The following pending diet order is being considered for treatment of Severe protein-calorie malnutrition:null

## 2024-04-24 ENCOUNTER — TRANSCRIPTION ENCOUNTER (OUTPATIENT)
Age: 89
End: 2024-04-24

## 2024-04-24 VITALS
HEART RATE: 84 BPM | OXYGEN SATURATION: 92 % | DIASTOLIC BLOOD PRESSURE: 70 MMHG | SYSTOLIC BLOOD PRESSURE: 103 MMHG | TEMPERATURE: 98 F | RESPIRATION RATE: 16 BRPM

## 2024-04-24 LAB
ANION GAP SERPL CALC-SCNC: 12 MMOL/L — SIGNIFICANT CHANGE UP (ref 5–17)
BUN SERPL-MCNC: 48 MG/DL — HIGH (ref 7–23)
CALCIUM SERPL-MCNC: 8.7 MG/DL — SIGNIFICANT CHANGE UP (ref 8.5–10.1)
CHLORIDE SERPL-SCNC: 103 MMOL/L — SIGNIFICANT CHANGE UP (ref 96–108)
CO2 SERPL-SCNC: 21 MMOL/L — LOW (ref 22–31)
CREAT SERPL-MCNC: 1.74 MG/DL — HIGH (ref 0.5–1.3)
EGFR: 27 ML/MIN/1.73M2 — LOW
GLUCOSE SERPL-MCNC: 107 MG/DL — HIGH (ref 70–99)
MAGNESIUM SERPL-MCNC: 2.2 MG/DL — SIGNIFICANT CHANGE UP (ref 1.6–2.6)
PHOSPHATE SERPL-MCNC: 4.1 MG/DL — SIGNIFICANT CHANGE UP (ref 2.5–4.5)
POTASSIUM SERPL-MCNC: 4.2 MMOL/L — SIGNIFICANT CHANGE UP (ref 3.5–5.3)
POTASSIUM SERPL-SCNC: 4.2 MMOL/L — SIGNIFICANT CHANGE UP (ref 3.5–5.3)
SODIUM SERPL-SCNC: 136 MMOL/L — SIGNIFICANT CHANGE UP (ref 135–145)

## 2024-04-24 PROCEDURE — 99238 HOSP IP/OBS DSCHRG MGMT 30/<: CPT

## 2024-04-24 RX ORDER — ALPRAZOLAM 0.25 MG
1 TABLET ORAL
Qty: 0 | Refills: 0 | DISCHARGE
Start: 2024-04-24

## 2024-04-24 RX ORDER — ACETAMINOPHEN 500 MG
2 TABLET ORAL
Qty: 0 | Refills: 0 | DISCHARGE
Start: 2024-04-24

## 2024-04-24 RX ORDER — LANOLIN ALCOHOL/MO/W.PET/CERES
1 CREAM (GRAM) TOPICAL
Qty: 0 | Refills: 0 | DISCHARGE
Start: 2024-04-24

## 2024-04-24 RX ORDER — METOPROLOL TARTRATE 50 MG
1 TABLET ORAL
Qty: 0 | Refills: 0 | DISCHARGE
Start: 2024-04-24

## 2024-04-24 RX ORDER — ATORVASTATIN CALCIUM 80 MG/1
1 TABLET, FILM COATED ORAL
Qty: 0 | Refills: 0 | DISCHARGE
Start: 2024-04-24

## 2024-04-24 RX ORDER — MULTIVIT-MIN/FERROUS GLUCONATE 9 MG/15 ML
1 LIQUID (ML) ORAL
Qty: 0 | Refills: 0 | DISCHARGE
Start: 2024-04-24

## 2024-04-24 RX ORDER — ASCORBIC ACID 60 MG
1 TABLET,CHEWABLE ORAL
Qty: 0 | Refills: 0 | DISCHARGE
Start: 2024-04-24

## 2024-04-24 RX ORDER — CHOLECALCIFEROL (VITAMIN D3) 125 MCG
1000 CAPSULE ORAL
Qty: 0 | Refills: 0 | DISCHARGE
Start: 2024-04-24

## 2024-04-24 RX ADMIN — Medication 50 MILLIGRAM(S): at 05:25

## 2024-04-24 NOTE — DISCHARGE NOTE NURSING/CASE MANAGEMENT/SOCIAL WORK - NSDCPEFALRISK_GEN_ALL_CORE
For information on Fall & Injury Prevention, visit: [email or web address] OR  [email or web address] OR  https://www.cdc.gov/steadi/patient.html

## 2024-04-24 NOTE — DISCHARGE NOTE PROVIDER - DETAILS OF MALNUTRITION DIAGNOSIS/DIAGNOSES
This patient has been assessed with a concern for Malnutrition and was treated during this hospitalization for the following Nutrition diagnosis/diagnoses:     -  04/23/2024: Severe protein-calorie malnutrition   continue current management

## 2024-04-24 NOTE — DISCHARGE NOTE PROVIDER - NSDCMRMEDTOKEN_GEN_ALL_CORE_FT
acetaminophen 325 mg oral tablet: 2 tab(s) orally every 6 hours As needed Temp greater or equal to 38C (100.4F), Mild Pain (1 - 3)  ALPRAZolam 0.25 mg oral tablet: 1 tab(s) orally 2 times a day As needed anxiety  aluminum hydroxide-magnesium hydroxide 200 mg-200 mg/5 mL oral suspension: 30 milliliter(s) orally every 4 hours As needed Dyspepsia  Antivert 25 mg oral tablet: 1 tab(s) orally 3 times a day, As Needed - for dizziness  ascorbic acid 500 mg oral tablet: 1 tab(s) orally once a day  aspirin 325 mg oral tablet: 1 tab(s) orally once a day  atorvastatin 20 mg oral tablet: 1 tab(s) orally once a day (at bedtime)  cholecalciferol oral tablet: 1000 unit(s) orally once a day  hydroCHLOROthiazide 12.5 mg oral capsule: 1 cap(s) orally once a day  losartan 100 mg oral tablet: 1 tab(s) orally once a day  melatonin 3 mg oral tablet: 1 tab(s) orally once a day (at bedtime) As needed Insomnia  metoprolol succinate 50 mg oral tablet, extended release: 1 tab(s) orally once a day  Multiple Vitamins with Minerals oral tablet: 1 tab(s) orally once a day  sertraline 25 mg oral tablet: 1 tab(s) orally once a day

## 2024-04-24 NOTE — DISCHARGE NOTE PROVIDER - HOSPITAL COURSE
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     PLAN  - c/w [facility]  - follow up w/ pt's son for goals of care

## 2024-04-24 NOTE — DISCHARGE NOTE NURSING/CASE MANAGEMENT/SOCIAL WORK - PATIENT PORTAL LINK FT
You can access the FollowMyHealth Patient Portal offered by [facility] by registering at the following website: [email or web address]. By joining [facility]’s FollowMyHealth portal, you will also be able to view your health information using other applications (apps) compatible with our system.

## 2024-04-24 NOTE — DISCHARGE NOTE PROVIDER - CARE PROVIDER_API CALL
July Razo  Psychiatry  [address] [place] [address]  Phone: [phone]  Fax: [phone]  Follow Up Time: 1 month

## 2024-04-24 NOTE — DISCHARGE NOTE PROVIDER - NSDCCPCAREPLAN_GEN_ALL_CORE_FT
PRINCIPAL DISCHARGE DIAGNOSIS  Diagnosis: Bleeding from wound  Assessment and Plan of Treatment: Resolved. Dressing present      SECONDARY DISCHARGE DIAGNOSES  Diagnosis: Generalized weakness  Assessment and Plan of Treatment: Physical Therapy/Fall precautions

## 2024-04-24 NOTE — DISCHARGE NOTE PROVIDER - ATTENDING DISCHARGE PHYSICAL EXAMINATION:
GENERAL: ALERT AND ORIENTED X 3, confused  CARDIO: NS1, S2, RRR  PULM: CTABL, no wheezing  GI: soft  EXTREMITIES: swan neck deformities of the hands and tophi of the elbows

## 2024-05-01 DIAGNOSIS — M20.032 SWAN-NECK DEFORMITY OF LEFT FINGER(S): ICD-10-CM

## 2024-05-01 DIAGNOSIS — I25.10 ATHEROSCLEROTIC HEART DISEASE OF NATIVE CORONARY ARTERY WITHOUT ANGINA PECTORIS: ICD-10-CM
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
## 2024-05-01 DIAGNOSIS — I50.9 HEART FAILURE, UNSPECIFIED: ICD-10-CM

## 2024-05-01 DIAGNOSIS — M20.031 SWAN-NECK DEFORMITY OF RIGHT FINGER(S): ICD-10-CM

## 2024-05-01 DIAGNOSIS — R58 HEMORRHAGE, NOT ELSEWHERE CLASSIFIED: ICD-10-CM

## 2024-05-01 DIAGNOSIS — G47.00 INSOMNIA, UNSPECIFIED: ICD-10-CM

## 2024-05-01 DIAGNOSIS — M06.9 RHEUMATOID ARTHRITIS, UNSPECIFIED: ICD-10-CM

## 2024-05-01 DIAGNOSIS — M70.32 OTHER BURSITIS OF ELBOW, LEFT ELBOW: ICD-10-CM

## 2024-05-01 DIAGNOSIS — E43 UNSPECIFIED SEVERE PROTEIN-CALORIE MALNUTRITION: ICD-10-CM

## 2024-05-01 DIAGNOSIS — M10.9 GOUT, UNSPECIFIED: ICD-10-CM
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
## 2024-05-01 DIAGNOSIS — Z79.899 OTHER LONG TERM (CURRENT) DRUG THERAPY: ICD-10-CM

## 2024-05-01 DIAGNOSIS — I11.0 HYPERTENSIVE HEART DISEASE WITH HEART FAILURE: ICD-10-CM

## 2024-05-01 DIAGNOSIS — F32.A DEPRESSION, UNSPECIFIED: ICD-10-CM

## 2024-05-01 DIAGNOSIS — S51.012A LACERATION WITHOUT FOREIGN BODY OF LEFT ELBOW, INITIAL ENCOUNTER: ICD-10-CM

## 2024-05-01 DIAGNOSIS — Z79.82 LONG TERM (CURRENT) USE OF ASPIRIN: ICD-10-CM

## 2024-05-01 DIAGNOSIS — Z79.818 LONG TERM (CURRENT) USE OF OTHER AGENTS AFFECTING ESTROGEN RECEPTORS AND ESTROGEN LEVELS: ICD-10-CM

## 2024-06-17 NOTE — ED ADULT NURSE NOTE - NSFALLRISKASMT_ED_ALL_ED_DT
PRIMARY CARE VISIT    Patient name and address: Anne Marie Madrigal [address] [facility] [address]  MRN number: [id number]   YOB: 1942   Patient Home phone: [phone] (home) [phone] (work)     Reason for visit:   Chief Complaint   Patient presents with   • Office Visit   • Hypertension           History of Present Illness    81 lady     Left knee djd  pain //  1.    Severe osteoarthritis of the medial compartment.  7/22/22  walking with a cane   wants to explore knee replacement even though she is 81  referral written     Bronchospasm .  Few wheezes today  //    never smoked but was around smokers //  always has wheezes //  wheezes   have always been well tolerated and do not bother her     bp on verapamil, hctz 50 mg per day, and losartan 100  for high bp readings needs all three  //      Was off the losartan and bp was up to 221/94    Needs to bring all meds each visit since meds are not here and bp readings are sometimes very worrisome      cholesterol but sometimes forgets to take her cholesterol medicines 261 in jun 1 2018 // atorvastatin at night   needs every night      needs blood tests    Mammogram  Done  3/20/23  [facility]   [facility]     Declined flu shot 9-25-20    Getting covid shots in mar 2021     7-18-22  Bone density  Impression:  Normal bone mineral density for patient age. Ingals       Review of Systems       Review of Systems   Constitutional:  Negative for activity change, appetite change and fatigue.   HENT:  Negative for congestion, dental problem, drooling, ear discharge, ear pain and sore throat.    Eyes:  Negative for discharge, redness and itching.   Respiratory:  Negative for apnea, cough, choking, chest tightness and shortness of breath.         Some cough noted    Cardiovascular:  Negative for chest pain and leg swelling.   Gastrointestinal:  Negative for abdominal distention, abdominal pain, constipation and diarrhea.   Endocrine: Negative for cold  intolerance and heat intolerance.   Genitourinary:  Negative for decreased urine volume, difficulty urinating, dysuria and urgency.   Musculoskeletal:  Negative for arthralgias, back pain and gait problem.   Skin:  Negative for color change, pallor, rash and wound.   Allergic/Immunologic: Negative for food allergies.   Neurological:  Negative for dizziness, seizures, facial asymmetry, speech difficulty, light-headedness, numbness and headaches.   Hematological:  Negative for adenopathy. Does not bruise/bleed easily.   Psychiatric/Behavioral:  Negative for agitation, behavioral problems, confusion, decreased concentration, dysphoric mood and hallucinations. The patient is not hyperactive.    All other systems reviewed and are negative.       Allergies  ALLERGIES:  No Known Allergies    Current Meds  Current Outpatient Medications   Medication Sig Dispense Refill   • Klor-Con M 10 MEQ sandeep ER tablet TAKE 1 TABLET BY MOUTH EVERY DAY 90 tablet 2   • atorvastatin (LIPITOR) 20 MG tablet Take 1 tablet by mouth daily. 90 tablet 3   • montelukast (SINGULAIR) 10 MG tablet Take 1 tablet by mouth daily. 90 tablet 3   • predniSONE (DELTASONE) 10 MG tablet Take 1 tablet by mouth daily. 23 tablet 1   • hydrochlorothiazide (HYDRODIURIL) 50 MG tablet TAKE 1 TABLET BY MOUTH EVERY DAY 90 tablet 3   • verapamil (CALAN SR) 240 MG CR tablet TAKE 1 TABLET BY MOUTH EVERY DAY 90 tablet 3   • ibuprofen (MOTRIN) 200 MG tablet Take 200 mg by mouth every 6 hours as needed for Pain (arthritis of the knees). Takes only ~1x/week     • albuterol (PROVENTIL HFA) 108 (90 Base) MCG/ACT inhaler INHALE 2 PUFFS 4 TIMES DAILY but do not over rely on this inhaler 1 Inhaler 3     No current facility-administered medications for this visit.       Active Problems  PROB@    Past Medical History  [unfilled]    Surgical History  Past Surgical History:   Procedure Laterality Date   • Cosmetic abdominoplasty tummy tuck     • Tubal ligation         Family  History  Family History   Problem Relation Age of Onset   • Diabetes Mother    • Stroke Father    • Heart disease Father        Social History  Social History     Socioeconomic History   • Marital status:      Spouse name: Not on file   • Number of children: Not on file   • Years of education: Not on file   • Highest education level: Not on file   Occupational History   • Not on file   Tobacco Use   • Smoking status: Never   • Smokeless tobacco: Never   Vaping Use   • Vaping status: never used   Substance and Sexual Activity   • Alcohol use: Not Currently   • Drug use: Not Currently   • Sexual activity: Not on file   Other Topics Concern   •  Service Not Asked   • Blood Transfusions Not Asked   • Caffeine Concern Not Asked   • Occupational Exposure Not Asked   • Hobby Hazards Not Asked   • Sleep Concern Not Asked   • Stress Concern Not Asked   • Weight Concern Not Asked   • Special Diet Not Asked   • Back Care Not Asked   • Exercise Not Asked   • Bike Helmet Not Asked   • Seat Belt Yes   • Self-Exams Not Asked   Social History Narrative   • Not on file     Social Determinants of Health     Financial Resource Strain: Low Risk  (1/3/2023)    Financial Resource Strain    • Unable to Get: None   Food Insecurity: Not At Risk (1/3/2023)    Food Insecurity    • Food Insecurity: Worried or Stressed about Money for Food: Never   Transportation Needs: Not At Risk (1/3/2023)    PRAPARE - Transportation    • Lack of Transportation (Medical): No    • Lack of Transportation (Non-Medical): No   Physical Activity: High Risk (1/3/2023)    Exercise Vital Sign    • Days of Exercise per Week: 0 days    • Minutes of Exercise per Session: 0 min   Stress: Low Risk  (1/3/2023)    Stress    • How Stressed: Not at all   Social Connections: Low Risk  (1/3/2023)    Social Connections    • Social Connectivity: 5 or more times a week   Interpersonal Safety: Not on file (1/3/2023)        Review  Patient's medications, allergies,  past medical, surgical, social and family histories were reviewed and updated as appropriate.     Vitals  Visit Vitals  /61 (BP Location: RUE - Right upper extremity, Patient Position: Sitting)   Pulse 65   Temp 97.2 °F (36.2 °C)   Resp 18   Ht 5' 7\" (1.702 m)   Wt 63.2 kg (139 lb 5.3 oz)   SpO2 98%   BMI 21.82 kg/m²       Physical    Physical Exam    Results/Data    Hemoglobin A1C (%)   Date Value   06/30/2023 5.6   07/28/2022 5.9 (H)        No visits with results within 6 Month(s) from this visit.   Latest known visit with results is:   Lab Services on 06/30/2023   Component Date Value Ref Range Status   • Sodium 06/30/2023 141  135 - 145 mmol/L Final   • Potassium 06/30/2023 3.4  3.4 - 5.1 mmol/L Final   • Chloride 06/30/2023 108  97 - 110 mmol/L Final   • Carbon Dioxide 06/30/2023 26  21 - 32 mmol/L Final   • Anion Gap 06/30/2023 10  7 - 19 mmol/L Final   • Glucose 06/30/2023 88  70 - 99 mg/dL Final   • BUN 06/30/2023 17  6 - 20 mg/dL Final   • Creatinine 06/30/2023 1.05 (H)  0.51 - 0.95 mg/dL Final   • Glomerular Filtration Rate 06/30/2023 54 (L)  >=60 Final    eGFR 30-59 mL/min/1.73m2 = Moderate decrease in kidney function. Stage 3 CKD (chronic kidney disease) or moderate kidney disease. Estimated GFR calculated using the CKD-EPI-R (2021) equation that does not include race in the creatinine calculation.   • BUN/Cr 06/30/2023 16  7 - 25 Final   • Calcium 06/30/2023 8.7  8.4 - 10.2 mg/dL Final   • Bilirubin, Total 06/30/2023 0.6  0.2 - 1.0 mg/dL Final   • GOT/AST 06/30/2023 13  <=37 Units/L Final   • GPT/ALT 06/30/2023 16  <64 Units/L Final   • Alkaline Phosphatase 06/30/2023 68  45 - 117 Units/L Final   • Albumin 06/30/2023 3.6  3.6 - 5.1 g/dL Final   • Protein, Total 06/30/2023 6.8  6.4 - 8.2 g/dL Final   • Globulin 06/30/2023 3.2  2.0 - 4.0 g/dL Final   • A/G Ratio 06/30/2023 1.1  1.0 - 2.4 Final   • Hemoglobin A1C 06/30/2023 5.6  4.5 - 5.6 % Final      Diabetic Screening  Non Diabetic:              <5.7%  Increased Risk:           5.7-6.4%  Diagnostic For Diabetes:  >6.4%    Diabetic Control  A1C%       eAG mg/dL  6.0            126  6.5            140  7.0            154  7.5            169  8.0            183  8.5            197  9.0            212  9.5            226  10.0           240   • Cholesterol 06/30/2023 262 (H)  <=199 mg/dL Final      Desirable         <200  Borderline High   200 to 239  High              >=240   • Triglycerides 06/30/2023 128  <=149 mg/dL Final      Normal            <150  Borderline High   150 to 199  High              200 to 499  Very High         >=500   • HDL 06/30/2023 59  >=50 mg/dL Final      Low              <40  Borderline Low   40 to 49  Near Optimal     50 to 59  Optimal          >=60   • LDL 06/30/2023 177 (H)  <=129 mg/dL Final      Optimal           <100  Near Optimal      100 to 129  Borderline High   130 to 159  High              160 to 189  Very High         >=190   • Non-HDL Cholesterol 06/30/2023 203  mg/dL Final      Therapeutic Target:  CHD and risk equivalents  <130  Multiple risk factors     <160  0 to 1 risk factor        <190   • Cholesterol/ HDL Ratio 06/30/2023 4.4  <=4.4 Final   • Microalbumin, Urine 06/30/2023 13.30  mg/dL Final   • Creatinine, Urine 06/30/2023 230.00  mg/dL Final   • Microalbumin/ Creatinine Ratio 06/30/2023 57.8 (H)  <30.0 mg/g Final    Short term hyperglycemia, exercise, urinary tract infections, marked hypertension, heart failure, and acute febrile illness can cause transient elevations in urinary albumin excretion. Due to marked day-to-day variability in albumin excretion, at least two of the three collections done in a 3 to 6 month period should show elevated levels before initially designating a patient as having microalbuminuria.   • TSH 06/30/2023 0.898  0.350 - 5.000 mcUnits/mL Final    Findings most consistent with euthyroid state, no additional testing suggested. TSH may be normal in patients with thyroid dysfunction and  pituitary disease. Clinical correlation recommended.    (Reflex TSH algorithm is not recommended in hospitalized patients. A variety of drugs, as well as serious acute and chronic illnesses may alter thyroid function tests. Commonly implicated drugs include glucocorticoids, dopamine, carbamazepine, iodine, amiodarone, lithium and heparin.)   • WBC 06/30/2023 5.4  4.2 - 11.0 K/mcL Final   • RBC 06/30/2023 4.49  4.00 - 5.20 mil/mcL Final   • HGB 06/30/2023 13.9  12.0 - 15.5 g/dL Final   • HCT 06/30/2023 42.8  36.0 - 46.5 % Final   • MCV 06/30/2023 95.3  78.0 - 100.0 fl Final   • MCH 06/30/2023 31.0  26.0 - 34.0 pg Final   • MCHC 06/30/2023 32.5  32.0 - 36.5 g/dL Final   • RDW-CV 06/30/2023 12.4  11.0 - 15.0 % Final   • RDW-SD 06/30/2023 43.2  39.0 - 50.0 fL Final   • PLT 06/30/2023 174  140 - 450 K/mcL Final   • NRBC 06/30/2023 0  <=0 /100 WBC Final   • Neutrophil, Percent 06/30/2023 39  % Final   • Lymphocytes, Percent 06/30/2023 39  % Final   • Mono, Percent 06/30/2023 8  % Final   • Eosinophils, Percent 06/30/2023 12  % Final   • Basophils, Percent 06/30/2023 2  % Final   • Immature Granulocytes 06/30/2023 0  % Final   • Absolute Neutrophils 06/30/2023 2.2  1.8 - 7.7 K/mcL Final   • Absolute Lymphocytes 06/30/2023 2.1  1.0 - 4.0 K/mcL Final   • Absolute Monocytes 06/30/2023 0.4  0.3 - 0.9 K/mcL Final   • Absolute Eosinophils  06/30/2023 0.6 (H)  0.0 - 0.5 K/mcL Final   • Absolute Basophils 06/30/2023 0.1  0.0 - 0.3 K/mcL Final   • Absolute Immature Granulocytes 06/30/2023 0.0  0.0 - 0.2 K/mcL Final         Assessment  Anne Marie was seen today for office visit and hypertension.    Diagnoses and all orders for this visit:    Primary osteoarthritis of left knee  -     SERVICE TO ORTHOPEDICS  -     CBC with Automated Differential; Future  -     Comprehensive Metabolic Panel; Future    Chronic obstructive asthma  (CMD)  -     CBC with Automated Differential; Future  -     Comprehensive Metabolic Panel;  Future    Hypertension, essential, benign  -     CBC with Automated Differential; Future  -     Comprehensive Metabolic Panel; Future  -     Microalbumin Urine Random; Future    IFG (impaired fasting glucose)  -     CBC with Automated Differential; Future  -     Comprehensive Metabolic Panel; Future  -     Glycohemoglobin; Future    Fatigue, unspecified type  -     CBC with Automated Differential; Future  -     Comprehensive Metabolic Panel; Future  -     Thyroid Stimulating Hormone Reflex; Future    Hyperlipidemia, unspecified hyperlipidemia type  -     Lipid Panel With Reflex; Future    Encounter for screening mammogram for malignant neoplasm of breast  -     MAMMO SCREENING BILATERAL; Future    Thoracic aorta atherosclerosis (CMD)      PLAN    Left knee djd    \\  pain //  1.    Severe osteoarthritis of the medial compartment.  7/22/22  walking with a cane   wants to explore knee replacement even though she is 81  referral written   6/17/24        Hypertension: Continue with the HCTZ 50 mg per day  and verapamil 240 mg per day   and  losartan 100 mg per day  and low salt diet   //  bp better on 6.17.24      (125/65 ON JUN 2023)    6/17/24      Chronic obstructive asthma: Albuterol is good for acute attacks, but should be used sparingly. Flovent 110 puffs twice a day long term can allow the lungs to heal. Singulair has added advantages for long term, but could not replace the Flovent. There is some wheezing -- most of the time, and never seems to go away. When asthma kicks up: prednisone 10 mg three times a day for three days twice a day for three days and one time a day for three days. Chest X ray Sept 09 was good. Scarf over face when out in the cold. PFT was done 5-3-12 and was good (actually mild obstruction FEV1/FVC 70 %). // stable asthma now   6/17/24      Thoracic aorta calcification   6/12/28  Mural calcifications thoracic aorta compatible with atherosclerosis  Monitor and control risk factors   6/17/24         Cholesterol:. Was too high and can lead to a heart attack or a stroke. Pravastastin 20 mg at night can cut the risk--but she got her cholesterol down to 214 at a health fair at [place] [facility] in early sept 2011 all the way down to 214 with oatmeal. Will keep an eye on the cholesterol and the liver tests. Cont with low cholesterol diet and walk thirty minutes a day. (no eggs sausage or ortez). Cholesterol up to 242 on 2 feb 2012: back on pravastatin// was off, then  Cholesterol now up to 267  ==> atorvastatin strongly recommended every night    6/17/24      Impaired fasting glucose ..  has had some elevations in the glucose; watch the sweets and walk 11/20/23      Aspirin 81 mg per day to cut down the risk of a stroke or a heart attack.  11/20/23      Colonoscopy done 8-15-06: Dr Mcintosh: normal: next in 10 years 2016. ordered 3-22-16 and 3-16-17 // 28 ally 17 dr anderson You had a colonoscopy performed at [facility] on 6/16/2017.   Benign adenomatous polyp(s) were removed. Based on the exam results, your age and history, no further colonoscopy screening is recommended. However, if you are in good health standing and desire testing, a repeat procedure would be recommended in 5 years. (2022)     3-19-21     Flu shot declined in Sept 09 and 9-22-11 & 9-11-13 and 9-18-14 and 2015. /2016 and 2017 and 10-19-18  or  9-20-19  And 9-25-20      See in three months with all your medicines     RETURN TO OFFICE  No follow-ups on file.    Tiburcio Renteria MD      MEDICARE WELLNESS VISIT NOTE    HISTORY OF PRESENT ILLNESS:   Anne Marie presents for her Subsequent Annual Medicare Wellness Visit.   She has no current complaints or concerns.      Patient Care Team:  Tiburcio Renteria MD as PCP - General        Patient Active Problem List   Diagnosis   • Caries   • Chronic obstructive asthma  (CMD)   • Chronic sinusitis   • Extrinsic asthma (CMD)   • Hyperlipidemia   • Hypertension, essential, benign   • IFG (impaired fasting glucose)   •  Myalgia   • Primary osteoarthritis of both knees   • Mammogram declined   • Influenza vaccination declined   • Fatigue         Past Medical History:   Diagnosis Date   • Asthma (CMD)    • Essential (primary) hypertension          Past Surgical History:   Procedure Laterality Date   • Cosmetic abdominoplasty tummy tuck     • Tubal ligation           Social History     Tobacco Use   • Smoking status: Never   • Smokeless tobacco: Never   Vaping Use   • Vaping status: never used   Substance Use Topics   • Alcohol use: Not Currently   • Drug use: Not Currently     Drug use:    Drug Use:    Not Current*    Family History   Problem Relation Age of Onset   • Diabetes Mother    • Stroke Father    • Heart disease Father        Current Outpatient Medications   Medication Sig Dispense Refill   • Klor-Con M 10 MEQ sandeep ER tablet TAKE 1 TABLET BY MOUTH EVERY DAY 90 tablet 2   • atorvastatin (LIPITOR) 20 MG tablet Take 1 tablet by mouth daily. 90 tablet 3   • montelukast (SINGULAIR) 10 MG tablet Take 1 tablet by mouth daily. 90 tablet 3   • predniSONE (DELTASONE) 10 MG tablet Take 1 tablet by mouth daily. 23 tablet 1   • hydrochlorothiazide (HYDRODIURIL) 50 MG tablet TAKE 1 TABLET BY MOUTH EVERY DAY 90 tablet 3   • verapamil (CALAN SR) 240 MG CR tablet TAKE 1 TABLET BY MOUTH EVERY DAY 90 tablet 3   • ibuprofen (MOTRIN) 200 MG tablet Take 200 mg by mouth every 6 hours as needed for Pain (arthritis of the knees). Takes only ~1x/week     • albuterol (PROVENTIL HFA) 108 (90 Base) MCG/ACT inhaler INHALE 2 PUFFS 4 TIMES DAILY but do not over rely on this inhaler 1 Inhaler 3     No current facility-administered medications for this visit.        The following items on the Medicare Health Risk Assessment were found to be positive  4.) During the past 4 weeks, what was the hardest physical activity you could do for at least 2 minutes?: Very Light     5.) Do you do moderate to strenuous exercise (brisk walk) for about 20 minutes for 3 or  more days per week?: No, I usually do not exercise this much     6 a.) How many servings of Fruits and Vegetables do you have each day ( 1 serving = 1 piece of fruit, 1/2 cup fruits or vegetables): 1 per day     6 b.) How many servings of High Fiber / Whole Grain Foods to you have each day ( 1 serving = 1 cup cold cereal, 1/2 cup cooked cereal, 1 slice bread): None     7b.) Do you feel unsteady when standing or walking?: Yes     7c.) Do you worry about falling?: Yes     11b.) Bowel control problems: Sometimes         Vision and Hearing screens:  hearing and vision grossly normal     Advance care planning documents on file - yes     Cognitive/Functional Status: no evidence of cognitive dysfunction by direct observation    Opioid Review: Anne Marie is not taking opioid medications.    Recent PHQ 2/9 Score:    PHQ 2:  PHQ 2 Score Adult PHQ 2 Score Adult PHQ 2 Interpretation Little interest or pleasure in activity?   11/20/2023  12:26 PM 0 No further screening needed 0       PHQ 9:       DEPRESSION ASSESSMENT/PLAN:  Depression screening is negative no further plan needed.     Body mass index is 21.82 kg/m².    BMI FOLLOW-UP/PLAN:  BMI is within normal range.    Needed Screening/Treatment:   None  Needed follow up:  None    See orders.   See Patient Instructions section.   No follow-ups on file.        21-May-2023 06:40

## 2025-04-16 NOTE — DIETITIAN INITIAL EVALUATION ADULT - PROBLEM/PLAN-4
FOLLOW UP: [phone]    REASON FOR CONVERSATION: Appointment    SYMPTOMS: seen in ED last night for recurrent L buttock abscess. +I&D, +antibiotic. Notified PCP who wrote General Surgery referral.     OTHER: PT scheduled for first available appointment on 5/1.     DISPOSITION: Callback From Office Today    
Spoke to patient no sooner appointment will put her on the wait list. She understood and confirmed.   
DISPLAY PLAN FREE TEXT